# Patient Record
Sex: MALE | Race: BLACK OR AFRICAN AMERICAN | NOT HISPANIC OR LATINO | Employment: FULL TIME | URBAN - METROPOLITAN AREA
[De-identification: names, ages, dates, MRNs, and addresses within clinical notes are randomized per-mention and may not be internally consistent; named-entity substitution may affect disease eponyms.]

---

## 2023-05-09 ENCOUNTER — APPOINTMENT (OUTPATIENT)
Dept: PRIMARY CARE | Facility: CLINIC | Age: 58
End: 2023-05-09
Payer: COMMERCIAL

## 2023-05-23 ENCOUNTER — OFFICE VISIT (OUTPATIENT)
Dept: PRIMARY CARE | Facility: CLINIC | Age: 58
End: 2023-05-23
Payer: COMMERCIAL

## 2023-05-23 VITALS
WEIGHT: 230.4 LBS | SYSTOLIC BLOOD PRESSURE: 173 MMHG | DIASTOLIC BLOOD PRESSURE: 100 MMHG | TEMPERATURE: 99.3 F | HEART RATE: 72 BPM | OXYGEN SATURATION: 98 %

## 2023-05-23 DIAGNOSIS — Z09 HOSPITAL DISCHARGE FOLLOW-UP: Primary | ICD-10-CM

## 2023-05-23 DIAGNOSIS — I25.118 CORONARY ARTERY DISEASE OF NATIVE HEART WITH STABLE ANGINA PECTORIS, UNSPECIFIED VESSEL OR LESION TYPE (CMS-HCC): ICD-10-CM

## 2023-05-23 DIAGNOSIS — R07.9 CHEST PAIN, UNSPECIFIED TYPE: ICD-10-CM

## 2023-05-23 DIAGNOSIS — I10 HYPERTENSION, UNSPECIFIED TYPE: ICD-10-CM

## 2023-05-23 PROCEDURE — 99214 OFFICE O/P EST MOD 30 MIN: CPT | Performed by: STUDENT IN AN ORGANIZED HEALTH CARE EDUCATION/TRAINING PROGRAM

## 2023-05-23 PROCEDURE — 3080F DIAST BP >= 90 MM HG: CPT | Performed by: STUDENT IN AN ORGANIZED HEALTH CARE EDUCATION/TRAINING PROGRAM

## 2023-05-23 PROCEDURE — 1036F TOBACCO NON-USER: CPT | Performed by: STUDENT IN AN ORGANIZED HEALTH CARE EDUCATION/TRAINING PROGRAM

## 2023-05-23 PROCEDURE — 3077F SYST BP >= 140 MM HG: CPT | Performed by: STUDENT IN AN ORGANIZED HEALTH CARE EDUCATION/TRAINING PROGRAM

## 2023-05-23 RX ORDER — AMLODIPINE BESYLATE 5 MG/1
5 TABLET ORAL DAILY
Qty: 90 TABLET | Refills: 1 | Status: SHIPPED | OUTPATIENT
Start: 2023-05-23 | End: 2023-11-29 | Stop reason: SDUPTHER

## 2023-05-23 RX ORDER — AMLODIPINE BESYLATE 5 MG/1
1 TABLET ORAL DAILY
COMMUNITY
Start: 2016-03-07 | End: 2023-05-23 | Stop reason: SDUPTHER

## 2023-05-23 RX ORDER — ATORVASTATIN CALCIUM 80 MG/1
80 TABLET, FILM COATED ORAL DAILY
Qty: 90 TABLET | Refills: 3 | Status: SHIPPED | OUTPATIENT
Start: 2023-05-23 | End: 2024-05-22

## 2023-05-23 RX ORDER — OMEPRAZOLE 20 MG/1
1 TABLET, DELAYED RELEASE ORAL DAILY
COMMUNITY
Start: 2015-02-15 | End: 2023-05-23

## 2023-05-23 RX ORDER — ASPIRIN 81 MG/1
81 TABLET ORAL DAILY
Qty: 30 TABLET | Refills: 11 | Status: SHIPPED | OUTPATIENT
Start: 2023-05-23 | End: 2024-05-22

## 2023-05-23 RX ORDER — IBUPROFEN 600 MG/1
TABLET ORAL 4 TIMES DAILY
COMMUNITY
Start: 2020-10-30 | End: 2023-05-23

## 2023-05-23 ASSESSMENT — PAIN SCALES - GENERAL: PAINLEVEL: 6

## 2023-05-23 NOTE — PROGRESS NOTES
Subjective   Patient ID: Derik Malin is a 58 y.o. male with a PMH of HTN who presents for hospital follow up and HTN. Of note patient's last visit with our clinic was in 2020.     HPI    #Hospital Follow up  #Chest Pain  - Recently admitted from 1/9/2023 to 1/12/2023 for chest pain/HTN. ECG and trop negative. CTA coronary shows 70% stenosis of LAD and RCA and 50% in left main. Echo LVEF 55% no RWML. Stress echo positive for inducible ischemia. Cath showed RCA 80%, LM 40%, pLAD 50% and mLAD 50%, LCx none. Discharged with aspirin, atorvastatin, and amlodipine. Had been complaint, but ran out. Had follow ups with cardiology, orthopedics and PCP. Had not seen ortho or cardiology yet. Please refer to discharge summary for more details.   - Since discharge, he has been feeling a lot better, but reports occasional SOB. Denies any chest pain, peripheral edema, headaches, vision changes. Reports no other complaints.  - Ran out of his current medications for awhile now; needs refill.     #HTN  - /101 and 173/100 on repeat   - Does not have BP cuff  - Current medications: Amlodipine 5mg every day,   - Diet: salad, stopped eating red meat. Eats fish and a lot of fruits. Drinks a lot of water.   - Denies T/E/D   - Asymptomatic      Review of Systems   All other systems reviewed and are negative.  Unless mentioned in HPI; 10 systems reviewed    Objective   Physical Exam  Vitals reviewed.   Constitutional:       General: He is not in acute distress.     Appearance: Normal appearance. He is not ill-appearing.   HENT:      Head: Normocephalic and atraumatic.      Right Ear: External ear normal.      Left Ear: External ear normal.      Nose: Nose normal.      Mouth/Throat:      Mouth: Mucous membranes are moist.   Eyes:      Extraocular Movements: Extraocular movements intact.      Conjunctiva/sclera: Conjunctivae normal.   Cardiovascular:      Rate and Rhythm: Normal rate and regular rhythm.      Pulses: Normal pulses.       Heart sounds: Normal heart sounds. No murmur heard.     No friction rub. No gallop.   Pulmonary:      Effort: Pulmonary effort is normal. No respiratory distress.      Breath sounds: Normal breath sounds. No wheezing, rhonchi or rales.   Abdominal:      General: Bowel sounds are normal. There is no distension.      Palpations: Abdomen is soft. There is no mass.      Tenderness: There is no abdominal tenderness. There is no guarding or rebound.   Musculoskeletal:         General: No swelling, tenderness or deformity. Normal range of motion.      Cervical back: Normal range of motion and neck supple.      Right lower leg: No edema.      Left lower leg: No edema.   Skin:     General: Skin is warm and dry.      Capillary Refill: Capillary refill takes less than 2 seconds.   Neurological:      General: No focal deficit present.      Mental Status: He is alert and oriented to person, place, and time. Mental status is at baseline.   Psychiatric:         Mood and Affect: Mood normal.         Behavior: Behavior normal.         Thought Content: Thought content normal.         Judgment: Judgment normal.       BP (!) 179/101 (BP Location: Right arm, Patient Position: Sitting, BP Cuff Size: Adult)   Pulse 73   Temp 37.4 °C (99.3 °F) (Temporal)   Wt 105 kg (230 lb 6.4 oz)   SpO2 99%    Lab Results   Component Value Date    WBC 3.7 (L) 01/12/2023    HGB 13.1 (L) 01/12/2023    HCT 39.6 (L) 01/12/2023    MCV 86 01/12/2023     01/12/2023      Lab Results   Component Value Date    GLUCOSE 90 01/12/2023    CALCIUM 10.1 01/12/2023     01/12/2023    K 4.3 01/12/2023    CO2 27 01/12/2023     01/12/2023    BUN 24 (H) 01/12/2023    CREATININE 1.27 01/12/2023        Assessment/Plan       Mr. Malin is a 59 yo M who came to the clinic presenting for hospital follow up as he was recently hospitalized for chest pain. Also found to severely hypertensive, but asymptomatic. Otherwise, clinically stable. Plan as  below:    #Hospital Follow up  #Chest pain  - Has been doing well and is clinically stable since discharge  - Medications list reviewed and reconciled; refilled as appropriate  - Due to see Cardiology; placed referral  - Hold off on Orthopedics referral as patient is no longer complaining of shoulder pain.  - Return/ED precautions discussed    #HTN  - Not at goal; asymptomatic  - Previously controlled with amlodipine 5mg. Will continue and refilled.   - Ordered BP cuff. Advised to check a few times a week and record in BP log.  - Encourage DASH diet and exercise  - Return/ED precautions discussed    RTC in 2 weeks for HTN follow up    Seen and discussed with Dr. Clay Dunlap DO  PGY-3 Family Medicine

## 2023-05-23 NOTE — LETTER
May 23, 2023     Patient: Derik Malin   YOB: 1965   Date of Visit: 5/23/2023       To Whom It May Concern:    Derik Malin was seen in my clinic on 5/23/2023 at 3:00 pm. Please excuse Derik for his absence from work on this day to make the appointment.    If you have any questions or concerns, please don't hesitate to call.         Sincerely,         Choco Dunlap DO        CC: No Recipients

## 2023-05-24 RX ORDER — ACETAMINOPHEN 500 MG
1 TABLET ORAL 3 TIMES WEEKLY
Qty: 1 KIT | Refills: 0 | Status: SHIPPED | OUTPATIENT
Start: 2023-05-24

## 2023-05-25 NOTE — PROGRESS NOTES
I saw and evaluated the patient. I personally obtained the key and critical portions of the history and physical exam or was physically present for key and critical portions performed by the resident/fellow. I reviewed the resident/fellow's documentation and discussed the patient with the resident/fellow. I agree with the resident/fellow's medical decision making as documented in the note.  ECG reviewed.   Chandler Williamson MD

## 2023-11-29 DIAGNOSIS — I10 HYPERTENSION, UNSPECIFIED TYPE: ICD-10-CM

## 2023-11-29 RX ORDER — AMLODIPINE BESYLATE 5 MG/1
5 TABLET ORAL DAILY
Qty: 90 TABLET | Refills: 0 | Status: SHIPPED | OUTPATIENT
Start: 2023-11-29

## 2023-11-29 NOTE — PROGRESS NOTES
Refilled amlodipine as requested by pharmacy. Will request that patient make follow up to reassess blood pressure given that he was recommended to have 2 week follow up after visit in May 2023 and has not been seen since.

## 2024-04-01 ENCOUNTER — APPOINTMENT (OUTPATIENT)
Dept: PRIMARY CARE | Facility: CLINIC | Age: 59
End: 2024-04-01
Payer: MEDICARE

## 2025-01-21 ENCOUNTER — HOSPITAL ENCOUNTER (OUTPATIENT)
Facility: HOSPITAL | Age: 60
Setting detail: OBSERVATION
LOS: 1 days | Discharge: HOME | End: 2025-01-22
Attending: STUDENT IN AN ORGANIZED HEALTH CARE EDUCATION/TRAINING PROGRAM | Admitting: INTERNAL MEDICINE
Payer: MEDICARE

## 2025-01-21 ENCOUNTER — APPOINTMENT (OUTPATIENT)
Dept: RADIOLOGY | Facility: HOSPITAL | Age: 60
End: 2025-01-21
Payer: MEDICARE

## 2025-01-21 ENCOUNTER — APPOINTMENT (OUTPATIENT)
Dept: CARDIOLOGY | Facility: HOSPITAL | Age: 60
End: 2025-01-21
Payer: MEDICARE

## 2025-01-21 ENCOUNTER — CLINICAL SUPPORT (OUTPATIENT)
Dept: EMERGENCY MEDICINE | Facility: HOSPITAL | Age: 60
End: 2025-01-21
Payer: MEDICARE

## 2025-01-21 DIAGNOSIS — I10 HYPERTENSION, UNSPECIFIED TYPE: ICD-10-CM

## 2025-01-21 DIAGNOSIS — R07.9 CHEST PAIN, UNSPECIFIED TYPE: ICD-10-CM

## 2025-01-21 DIAGNOSIS — I20.0 UNSTABLE ANGINA (MULTI): ICD-10-CM

## 2025-01-21 DIAGNOSIS — I25.118 CORONARY ARTERY DISEASE OF NATIVE HEART WITH STABLE ANGINA PECTORIS, UNSPECIFIED VESSEL OR LESION TYPE: ICD-10-CM

## 2025-01-21 DIAGNOSIS — I20.89 STABLE ANGINA (CMS-HCC): Primary | ICD-10-CM

## 2025-01-21 LAB
ALBUMIN SERPL BCP-MCNC: 4.6 G/DL (ref 3.4–5)
ALP SERPL-CCNC: 60 U/L (ref 33–136)
ALT SERPL W P-5'-P-CCNC: 38 U/L (ref 10–52)
ANION GAP SERPL CALC-SCNC: 14 MMOL/L (ref 10–20)
AORTIC VALVE PEAK VELOCITY: 1.19 M/S
AST SERPL W P-5'-P-CCNC: 38 U/L (ref 9–39)
ATRIAL RATE: 76 BPM
AV PEAK GRADIENT: 6 MMHG
AVA (PEAK VEL): 2.79 CM2
BASOPHILS # BLD AUTO: 0.02 X10*3/UL (ref 0–0.1)
BASOPHILS NFR BLD AUTO: 0.5 %
BILIRUB SERPL-MCNC: 0.9 MG/DL (ref 0–1.2)
BNP SERPL-MCNC: 12 PG/ML (ref 0–99)
BUN SERPL-MCNC: 15 MG/DL (ref 6–23)
CALCIUM SERPL-MCNC: 10 MG/DL (ref 8.6–10.6)
CARDIAC TROPONIN I PNL SERPL HS: 4 NG/L (ref 0–53)
CARDIAC TROPONIN I PNL SERPL HS: 5 NG/L (ref 0–53)
CHLORIDE SERPL-SCNC: 104 MMOL/L (ref 98–107)
CHOLEST SERPL-MCNC: 168 MG/DL (ref 0–199)
CHOLESTEROL/HDL RATIO: 5.1
CO2 SERPL-SCNC: 26 MMOL/L (ref 21–32)
CREAT SERPL-MCNC: 0.91 MG/DL (ref 0.5–1.3)
D DIMER PPP FEU-MCNC: 523 NG/ML FEU
EGFRCR SERPLBLD CKD-EPI 2021: >90 ML/MIN/1.73M*2
EJECTION FRACTION APICAL 4 CHAMBER: 65.9
EJECTION FRACTION: 63 %
EOSINOPHIL # BLD AUTO: 0.12 X10*3/UL (ref 0–0.7)
EOSINOPHIL NFR BLD AUTO: 3.1 %
ERYTHROCYTE [DISTWIDTH] IN BLOOD BY AUTOMATED COUNT: 12.3 % (ref 11.5–14.5)
EST. AVERAGE GLUCOSE BLD GHB EST-MCNC: 128 MG/DL
GLUCOSE SERPL-MCNC: 113 MG/DL (ref 74–99)
HBA1C MFR BLD: 6.1 %
HCT VFR BLD AUTO: 45 % (ref 41–52)
HDLC SERPL-MCNC: 32.7 MG/DL
HGB BLD-MCNC: 14.8 G/DL (ref 13.5–17.5)
IMM GRANULOCYTES # BLD AUTO: 0 X10*3/UL (ref 0–0.7)
IMM GRANULOCYTES NFR BLD AUTO: 0 % (ref 0–0.9)
LDLC SERPL CALC-MCNC: 122 MG/DL
LEFT ATRIUM VOLUME AREA LENGTH INDEX BSA: 22.1 ML/M2
LEFT VENTRICLE INTERNAL DIMENSION DIASTOLE: 4.4 CM (ref 3.5–6)
LEFT VENTRICULAR OUTFLOW TRACT DIAMETER: 2.1 CM
LYMPHOCYTES # BLD AUTO: 1.86 X10*3/UL (ref 1.2–4.8)
LYMPHOCYTES NFR BLD AUTO: 47.8 %
MCH RBC QN AUTO: 28.8 PG (ref 26–34)
MCHC RBC AUTO-ENTMCNC: 32.9 G/DL (ref 32–36)
MCV RBC AUTO: 88 FL (ref 80–100)
MITRAL VALVE E/A RATIO: 0.75
MONOCYTES # BLD AUTO: 0.33 X10*3/UL (ref 0.1–1)
MONOCYTES NFR BLD AUTO: 8.5 %
NEUTROPHILS # BLD AUTO: 1.56 X10*3/UL (ref 1.2–7.7)
NEUTROPHILS NFR BLD AUTO: 40.1 %
NON HDL CHOLESTEROL: 135 MG/DL (ref 0–149)
NRBC BLD-RTO: 0 /100 WBCS (ref 0–0)
P AXIS: 54 DEGREES
P OFFSET: 211 MS
P ONSET: 152 MS
PLATELET # BLD AUTO: 283 X10*3/UL (ref 150–450)
POTASSIUM SERPL-SCNC: 4.3 MMOL/L (ref 3.5–5.3)
PR INTERVAL: 142 MS
PROT SERPL-MCNC: 8.4 G/DL (ref 6.4–8.2)
Q ONSET: 223 MS
QRS COUNT: 12 BEATS
QRS DURATION: 130 MS
QT INTERVAL: 434 MS
QTC CALCULATION(BAZETT): 488 MS
QTC FREDERICIA: 469 MS
R AXIS: 47 DEGREES
RBC # BLD AUTO: 5.13 X10*6/UL (ref 4.5–5.9)
RIGHT VENTRICLE FREE WALL PEAK S': 10.1 CM/S
SODIUM SERPL-SCNC: 140 MMOL/L (ref 136–145)
T AXIS: 27 DEGREES
T OFFSET: 440 MS
TRICUSPID ANNULAR PLANE SYSTOLIC EXCURSION: 1.6 CM
TRIGL SERPL-MCNC: 66 MG/DL (ref 0–149)
VENTRICULAR RATE: 76 BPM
VLDL: 13 MG/DL (ref 0–40)
WBC # BLD AUTO: 3.9 X10*3/UL (ref 4.4–11.3)

## 2025-01-21 PROCEDURE — 1200000002 HC GENERAL ROOM WITH TELEMETRY DAILY

## 2025-01-21 PROCEDURE — 80061 LIPID PANEL: CPT

## 2025-01-21 PROCEDURE — 71046 X-RAY EXAM CHEST 2 VIEWS: CPT

## 2025-01-21 PROCEDURE — 93005 ELECTROCARDIOGRAM TRACING: CPT

## 2025-01-21 PROCEDURE — 36415 COLL VENOUS BLD VENIPUNCTURE: CPT

## 2025-01-21 PROCEDURE — 85379 FIBRIN DEGRADATION QUANT: CPT

## 2025-01-21 PROCEDURE — C8929 TTE W OR WO FOL WCON,DOPPLER: HCPCS

## 2025-01-21 PROCEDURE — 93306 TTE W/DOPPLER COMPLETE: CPT | Performed by: INTERNAL MEDICINE

## 2025-01-21 PROCEDURE — 99285 EMERGENCY DEPT VISIT HI MDM: CPT | Mod: 25 | Performed by: STUDENT IN AN ORGANIZED HEALTH CARE EDUCATION/TRAINING PROGRAM

## 2025-01-21 PROCEDURE — 84484 ASSAY OF TROPONIN QUANT: CPT

## 2025-01-21 PROCEDURE — 2500000004 HC RX 250 GENERAL PHARMACY W/ HCPCS (ALT 636 FOR OP/ED)

## 2025-01-21 PROCEDURE — 2500000001 HC RX 250 WO HCPCS SELF ADMINISTERED DRUGS (ALT 637 FOR MEDICARE OP)

## 2025-01-21 PROCEDURE — 71046 X-RAY EXAM CHEST 2 VIEWS: CPT | Performed by: STUDENT IN AN ORGANIZED HEALTH CARE EDUCATION/TRAINING PROGRAM

## 2025-01-21 PROCEDURE — 83880 ASSAY OF NATRIURETIC PEPTIDE: CPT

## 2025-01-21 PROCEDURE — 83036 HEMOGLOBIN GLYCOSYLATED A1C: CPT

## 2025-01-21 PROCEDURE — 80053 COMPREHEN METABOLIC PANEL: CPT

## 2025-01-21 PROCEDURE — 85025 COMPLETE CBC W/AUTO DIFF WBC: CPT

## 2025-01-21 RX ORDER — NITROGLYCERIN 0.4 MG/1
0.4 TABLET SUBLINGUAL EVERY 5 MIN PRN
Status: DISCONTINUED | OUTPATIENT
Start: 2025-01-21 | End: 2025-01-22 | Stop reason: HOSPADM

## 2025-01-21 RX ORDER — NAPROXEN SODIUM 220 MG/1
81 TABLET, FILM COATED ORAL DAILY
Status: DISCONTINUED | OUTPATIENT
Start: 2025-01-22 | End: 2025-01-22 | Stop reason: HOSPADM

## 2025-01-21 RX ORDER — NITROGLYCERIN 0.4 MG/1
0.4 TABLET SUBLINGUAL ONCE
Status: COMPLETED | OUTPATIENT
Start: 2025-01-21 | End: 2025-01-21

## 2025-01-21 RX ORDER — NAPROXEN SODIUM 220 MG/1
325 TABLET, FILM COATED ORAL DAILY
Status: DISCONTINUED | OUTPATIENT
Start: 2025-01-21 | End: 2025-01-21

## 2025-01-21 RX ORDER — ACETAMINOPHEN 325 MG/1
975 TABLET ORAL 3 TIMES DAILY
Status: DISCONTINUED | OUTPATIENT
Start: 2025-01-21 | End: 2025-01-22 | Stop reason: HOSPADM

## 2025-01-21 RX ORDER — ATORVASTATIN CALCIUM 80 MG/1
80 TABLET, FILM COATED ORAL DAILY
Status: DISCONTINUED | OUTPATIENT
Start: 2025-01-21 | End: 2025-01-22 | Stop reason: HOSPADM

## 2025-01-21 RX ORDER — CARVEDILOL 6.25 MG/1
6.25 TABLET ORAL 2 TIMES DAILY
Status: DISCONTINUED | OUTPATIENT
Start: 2025-01-21 | End: 2025-01-22

## 2025-01-21 RX ORDER — ENOXAPARIN SODIUM 100 MG/ML
40 INJECTION SUBCUTANEOUS EVERY 24 HOURS
Status: DISCONTINUED | OUTPATIENT
Start: 2025-01-21 | End: 2025-01-22 | Stop reason: HOSPADM

## 2025-01-21 RX ORDER — AMLODIPINE BESYLATE 5 MG/1
5 TABLET ORAL DAILY
Status: DISCONTINUED | OUTPATIENT
Start: 2025-01-21 | End: 2025-01-22 | Stop reason: HOSPADM

## 2025-01-21 RX ADMIN — NITROGLYCERIN 0.4 MG: 0.4 TABLET SUBLINGUAL at 18:16

## 2025-01-21 RX ADMIN — NITROGLYCERIN 0.4 MG: 0.4 TABLET SUBLINGUAL at 10:23

## 2025-01-21 RX ADMIN — ATORVASTATIN CALCIUM 80 MG: 80 TABLET, FILM COATED ORAL at 17:13

## 2025-01-21 RX ADMIN — PERFLUTREN 3 ML OF DILUTION: 6.52 INJECTION, SUSPENSION INTRAVENOUS at 14:11

## 2025-01-21 RX ADMIN — CARVEDILOL 6.25 MG: 6.25 TABLET, FILM COATED ORAL at 20:17

## 2025-01-21 RX ADMIN — ACETAMINOPHEN 975 MG: 325 TABLET, FILM COATED ORAL at 17:12

## 2025-01-21 RX ADMIN — NITROGLYCERIN 0.4 MG: 0.4 TABLET SUBLINGUAL at 11:05

## 2025-01-21 RX ADMIN — ENOXAPARIN SODIUM 40 MG: 40 INJECTION, SOLUTION SUBCUTANEOUS at 17:13

## 2025-01-21 SDOH — SOCIAL STABILITY: SOCIAL INSECURITY: DOES ANYONE TRY TO KEEP YOU FROM HAVING/CONTACTING OTHER FRIENDS OR DOING THINGS OUTSIDE YOUR HOME?: NO

## 2025-01-21 SDOH — SOCIAL STABILITY: SOCIAL INSECURITY: DO YOU FEEL UNSAFE GOING BACK TO THE PLACE WHERE YOU ARE LIVING?: NO

## 2025-01-21 SDOH — SOCIAL STABILITY: SOCIAL INSECURITY: WITHIN THE LAST YEAR, HAVE YOU BEEN HUMILIATED OR EMOTIONALLY ABUSED IN OTHER WAYS BY YOUR PARTNER OR EX-PARTNER?: NO

## 2025-01-21 SDOH — ECONOMIC STABILITY: INCOME INSECURITY: IN THE PAST 12 MONTHS HAS THE ELECTRIC, GAS, OIL, OR WATER COMPANY THREATENED TO SHUT OFF SERVICES IN YOUR HOME?: NO

## 2025-01-21 SDOH — SOCIAL STABILITY: SOCIAL INSECURITY: HAVE YOU HAD THOUGHTS OF HARMING ANYONE ELSE?: NO

## 2025-01-21 SDOH — SOCIAL STABILITY: SOCIAL INSECURITY: WITHIN THE LAST YEAR, HAVE YOU BEEN AFRAID OF YOUR PARTNER OR EX-PARTNER?: NO

## 2025-01-21 SDOH — SOCIAL STABILITY: SOCIAL INSECURITY
WITHIN THE LAST YEAR, HAVE YOU BEEN RAPED OR FORCED TO HAVE ANY KIND OF SEXUAL ACTIVITY BY YOUR PARTNER OR EX-PARTNER?: NO

## 2025-01-21 SDOH — SOCIAL STABILITY: SOCIAL INSECURITY: DO YOU FEEL ANYONE HAS EXPLOITED OR TAKEN ADVANTAGE OF YOU FINANCIALLY OR OF YOUR PERSONAL PROPERTY?: NO

## 2025-01-21 SDOH — SOCIAL STABILITY: SOCIAL INSECURITY: HAS ANYONE EVER THREATENED TO HURT YOUR FAMILY OR YOUR PETS?: NO

## 2025-01-21 SDOH — ECONOMIC STABILITY: FOOD INSECURITY: WITHIN THE PAST 12 MONTHS, YOU WORRIED THAT YOUR FOOD WOULD RUN OUT BEFORE YOU GOT THE MONEY TO BUY MORE.: NEVER TRUE

## 2025-01-21 SDOH — SOCIAL STABILITY: SOCIAL INSECURITY: ABUSE: ADULT

## 2025-01-21 SDOH — SOCIAL STABILITY: SOCIAL INSECURITY
WITHIN THE LAST YEAR, HAVE YOU BEEN KICKED, HIT, SLAPPED, OR OTHERWISE PHYSICALLY HURT BY YOUR PARTNER OR EX-PARTNER?: NO

## 2025-01-21 SDOH — SOCIAL STABILITY: SOCIAL INSECURITY: ARE YOU OR HAVE YOU BEEN THREATENED OR ABUSED PHYSICALLY, EMOTIONALLY, OR SEXUALLY BY ANYONE?: NO

## 2025-01-21 SDOH — SOCIAL STABILITY: SOCIAL INSECURITY: HAVE YOU HAD ANY THOUGHTS OF HARMING ANYONE ELSE?: NO

## 2025-01-21 SDOH — ECONOMIC STABILITY: FOOD INSECURITY: WITHIN THE PAST 12 MONTHS, THE FOOD YOU BOUGHT JUST DIDN'T LAST AND YOU DIDN'T HAVE MONEY TO GET MORE.: NEVER TRUE

## 2025-01-21 SDOH — SOCIAL STABILITY: SOCIAL INSECURITY: ARE THERE ANY APPARENT SIGNS OF INJURIES/BEHAVIORS THAT COULD BE RELATED TO ABUSE/NEGLECT?: NO

## 2025-01-21 SDOH — SOCIAL STABILITY: SOCIAL INSECURITY: WERE YOU ABLE TO COMPLETE ALL THE BEHAVIORAL HEALTH SCREENINGS?: YES

## 2025-01-21 ASSESSMENT — ACTIVITIES OF DAILY LIVING (ADL)
JUDGMENT_ADEQUATE_SAFELY_COMPLETE_DAILY_ACTIVITIES: YES
TOILETING: INDEPENDENT
FEEDING YOURSELF: INDEPENDENT
GROOMING: INDEPENDENT
BATHING: INDEPENDENT
WALKS IN HOME: INDEPENDENT
PATIENT'S MEMORY ADEQUATE TO SAFELY COMPLETE DAILY ACTIVITIES?: YES
LACK_OF_TRANSPORTATION: NO
HEARING - RIGHT EAR: FUNCTIONAL
DRESSING YOURSELF: INDEPENDENT
ADEQUATE_TO_COMPLETE_ADL: YES
HEARING - LEFT EAR: FUNCTIONAL

## 2025-01-21 ASSESSMENT — LIFESTYLE VARIABLES
HOW OFTEN DO YOU HAVE 6 OR MORE DRINKS ON ONE OCCASION: NEVER
HOW MANY STANDARD DRINKS CONTAINING ALCOHOL DO YOU HAVE ON A TYPICAL DAY: PATIENT DOES NOT DRINK
AUDIT-C TOTAL SCORE: 0
EVER HAD A DRINK FIRST THING IN THE MORNING TO STEADY YOUR NERVES TO GET RID OF A HANGOVER: NO
AUDIT-C TOTAL SCORE: 0
EVER FELT BAD OR GUILTY ABOUT YOUR DRINKING: NO
HAVE YOU EVER FELT YOU SHOULD CUT DOWN ON YOUR DRINKING: NO
TOTAL SCORE: 0
SKIP TO QUESTIONS 9-10: 1
HOW OFTEN DO YOU HAVE A DRINK CONTAINING ALCOHOL: NEVER
HAVE PEOPLE ANNOYED YOU BY CRITICIZING YOUR DRINKING: NO

## 2025-01-21 ASSESSMENT — COGNITIVE AND FUNCTIONAL STATUS - GENERAL
DAILY ACTIVITIY SCORE: 24
PATIENT BASELINE BEDBOUND: NO
MOBILITY SCORE: 24
MOBILITY SCORE: 24
DAILY ACTIVITIY SCORE: 24

## 2025-01-21 ASSESSMENT — PAIN SCALES - GENERAL
PAINLEVEL_OUTOF10: 8
PAINLEVEL_OUTOF10: 0 - NO PAIN
PAINLEVEL_OUTOF10: 6
PAINLEVEL_OUTOF10: 4

## 2025-01-21 ASSESSMENT — ENCOUNTER SYMPTOMS
NUMBNESS: 1
CHEST TIGHTNESS: 0
ABDOMINAL DISTENTION: 0
ABDOMINAL PAIN: 0
DIFFICULTY URINATING: 0
PALPITATIONS: 0
NAUSEA: 1
MYALGIAS: 1

## 2025-01-21 ASSESSMENT — PAIN DESCRIPTION - PAIN TYPE: TYPE: ACUTE PAIN

## 2025-01-21 ASSESSMENT — COLUMBIA-SUICIDE SEVERITY RATING SCALE - C-SSRS
2. HAVE YOU ACTUALLY HAD ANY THOUGHTS OF KILLING YOURSELF?: NO
6. HAVE YOU EVER DONE ANYTHING, STARTED TO DO ANYTHING, OR PREPARED TO DO ANYTHING TO END YOUR LIFE?: NO
1. IN THE PAST MONTH, HAVE YOU WISHED YOU WERE DEAD OR WISHED YOU COULD GO TO SLEEP AND NOT WAKE UP?: NO

## 2025-01-21 ASSESSMENT — PAIN - FUNCTIONAL ASSESSMENT
PAIN_FUNCTIONAL_ASSESSMENT: 0-10

## 2025-01-21 ASSESSMENT — PAIN DESCRIPTION - DESCRIPTORS
DESCRIPTORS: POUNDING
DESCRIPTORS: POUNDING

## 2025-01-21 ASSESSMENT — PATIENT HEALTH QUESTIONNAIRE - PHQ9
2. FEELING DOWN, DEPRESSED OR HOPELESS: NOT AT ALL
SUM OF ALL RESPONSES TO PHQ9 QUESTIONS 1 & 2: 0
1. LITTLE INTEREST OR PLEASURE IN DOING THINGS: NOT AT ALL

## 2025-01-21 NOTE — PROGRESS NOTES
"Pharmacy Medication History Review    Derik Malin is a 60 y.o. male admitted for Unstable angina (Multi). Pharmacy reviewed the patient's [id number] medications and allergies for accuracy.    Medications ADDED:  N/A  Medications CHANGED:  N/A  Medications REMOVED:   N/A     The list below reflects the updated PTA list.   Prior to Admission Medications   Prescriptions Last Dose Informant   amLODIPine (Norvasc) 5 mg tablet 2025 Spouse/Significant Other   Sig: Take 1 tablet (5 mg) by mouth once daily.   atorvastatin (Lipitor) 80 mg tablet Not Taking    Sig: Take 1 tablet (80 mg) by mouth once daily.   Patient not taking: Reported on 2025   blood pressure monitor (Blood Pressure Kit) kit  Spouse/Significant Other   Si kit 3 (three) times a week.      Facility-Administered Medications: None        The list below reflects the updated allergy list. Please review each documented allergy for additional clarification and justification.  Allergies  Reviewed by Mei aHle on 2025   No Known Allergies         Patient accepts M2B at discharge.     Sources:   [facility]  Pharmacy dispense history  Spouse, Good historian     Additional Comments:  I interviewed the patients wife Genia Malin listed as the Alternate .      MEI HALE  Pharmacy Technician  25     Secure Chat preferred   If no response call [phone] or [email or web address] \"Med Rec\"   "

## 2025-01-21 NOTE — ED PROVIDER NOTES
HPI   Chief Complaint   Patient presents with    Chest Pain       HPI  Patient is a 60-year-old past medical history of CAD status post CABG, A-fib not anticoagulated presenting with chest pain.  Patient said yesterday when he shoveled snow, though he did not have chest pain but he felt weird.  Unfortunately, around 6:30 AM this morning he has been having chest pain.  The chest pain is persistent sharp and radiates towards his right arm.  Patient has experienced this before.  It was about 10 years ago.  Patient denies any fever, nausea and vomiting.      Patient History   No past medical history on file.  Past Surgical History:   Procedure Laterality Date    CT ABDOMEN PELVIS ANGIOGRAM W AND/OR WO IV CONTRAST  11/25/2013    CT ABDOMEN PELVIS ANGIOGRAM W AND/OR WO IV CONTRAST 11/25/2013 [facility] EMERGENCY LEGACY    CT ANGIO HEART CORONARY  1/10/2023    CT HEART CORONARY ANGIOGRAM 1/10/2023 DOCTOR OFFICE LEGACY    HERNIA REPAIR  02/24/2015    Hernia Repair Inguinal Sliding    MR HEAD ANGIO WO IV CONTRAST  2/28/2016    MR HEAD ANGIO WO IV CONTRAST 2/28/2016 [facility] CLINICAL LEGACY    MR NECK ANGIO WO IV CONTRAST  2/28/2016    MR NECK ANGIO WO IV CONTRAST 2/28/2016 [facility] CLINICAL LEGACY     No family history on file.  Social History     Tobacco Use    Smoking status: Never    Smokeless tobacco: Never   Substance Use Topics    Alcohol use: Never    Drug use: Never       Physical Exam   ED Triage Vitals [01/21/25 0952]   Temperature Heart Rate Respirations BP   36.6 °C (97.9 °F) 77 18 157/89      Pulse Ox Temp Source Heart Rate Source Patient Position   96 % Oral Monitor --      BP Location FiO2 (%)     -- --       Physical Exam  Constitutional:       Appearance: He is well-developed.   HENT:      Head: Normocephalic and atraumatic.   Cardiovascular:      Rate and Rhythm: Normal rate and regular rhythm.      Heart sounds: Normal heart sounds.   Pulmonary:      Effort: Pulmonary effort is normal.      Breath sounds: Normal breath  sounds.   Abdominal:      General: Bowel sounds are normal.      Palpations: Abdomen is soft.   Musculoskeletal:      Cervical back: Normal range of motion.   Skin:     General: Skin is warm.   Neurological:      General: No focal deficit present.      Mental Status: He is alert and oriented to person, place, and time.           ED Course & MDM   ED Course as of 01/21/25 1225   Tue Jan 21, 2025   1035 ED resident supervision attestation: 60-year-old male with extensive cardiac history presenting to the emergency department with right-sided chest pain that started after shoveling snow.  Describes radiation into his right arm.  Got 324 mg of aspirin prehospital.  Still endorsing chest pain despite giving sublingual nitroglycerin.  Not undergone cardiac stress testing or echo in the past 2 years.  On physical exam, patient is hemodynamically stable, still endorsing chest tightness and discomfort despite receiving sublingual nitrogen.  No reproducible chest wall tenderness. Bilateral 2+ radial pulses, no abdominal distention, guarding or discomfort to palpation.  EKG shows a new right bundle branch block, we will proceed with cardiopulmonary workup, chest x-ray, offer pain control with plan for admission for cardiac restratification and screening for possible PE given EKG findings and comorbidities.    Nesha Garcia DO  Emergency Medicine PGY-3 [PW]      ED Course User Index  [PW] Nesha Gracia DO                 No data recorded     [place] Coma Scale Score: 15 (01/21/25 0954 : Annie Maddox RN)                           Medical Decision Making  Patient is a 60-year-old past medical history of CAD status post CABG, A-fib not anticoagulated presenting with chest pain.  The differential diagnoses considered for this patient were myocardial infarction, unstable angina, pneumonia and CHF.  At bedside patient was hemodynamically stable and reserved.  Patient was endorsing chest pain show patient received 2 rounds of  nitroglycerin.  Patient chest pain improved after the second dose.  Patient physical exam was benign.  Patient EKG showed normal sinus rhythm, with ventricular rate of 76.  Patient did have new right bundle branch with some V3 V4 ST depressions.  Patient initial troponin was 4 and repeat troponin was 5.  This makes myocardial infarction less likely.  Patient also had BNP less than 12 and chest x-ray without pulmonary edema.  CHF exacerbation is less likely.  Patient did have D-dimers as elevated at 523.  Using years criteria pulmonary embolism was excluded.  Patient CBC and CMP were unremarkable except for leukopenia at 3.9.  This is around patient baseline.  With the new finding of right bundle branch and excruciating chest pain radiating to the right hand, patient was admitted to the medicine team for cardiac workup.  Patient last echogram was about 2 years ago.  Patient did have stress test about a month ago which was unremarkable.        Procedure  Procedures     Ramin Maldonado MD  Resident  01/21/25 1224       Ramin Maldonado MD  Resident  01/21/25 1244

## 2025-01-21 NOTE — H&P
History Of Present Illness  Derik Malin is a 60 y.o. male with PMH CAD s/p CABG x2 (LIMA to LAD, SVG to OM) in 2/2024, HTN, HLD, and pAFib (no AC) who presents to [facility] ED on 1/21/25 with right arm and shoulder pain. This pain is noted to continue down his arm. This pain started yesterday, but he didn't really take note until this morning. This morning, he was sitting at his desk at home and stated that his pain was increasing to 8/10. This pain was bothering enough that he decided to come in. After receiving nitroglycerin, he reports that this pain decreased to 6/10.     He reports that he only takes amlodipine 5mg daily at home.     He denies weakness, shortness of breath, NV, or fever.     Past Medical History  He has no past medical history on file.    Surgical History  He has a past surgical history that includes Hernia repair (02/24/2015); CT angio abdomen pelvis w and or wo IV IV contrast (11/25/2013); MR angio head wo IV contrast (2/28/2016); MR angio neck wo IV contrast (2/28/2016); and CT angio coronary art with heartflow if score >30% (1/10/2023).     Social History  He reports that he has never smoked. He has never used smokeless tobacco. He reports that he does not drink alcohol and does not use drugs.    Family History  No family history on file.     Allergies  Patient has no known allergies.    Review of Systems   Respiratory:  Negative for chest tightness.    Cardiovascular:  Negative for chest pain, palpitations and leg swelling.   Gastrointestinal:  Positive for nausea. Negative for abdominal distention and abdominal pain.   Genitourinary:  Negative for difficulty urinating.   Musculoskeletal:  Positive for myalgias.        Right shoulder pain radiating down arm   Neurological:  Positive for numbness.     Physical Exam  Constitutional:       Appearance: Normal appearance.   Cardiovascular:      Rate and Rhythm: Normal rate and regular rhythm.      Pulses: Normal pulses.      Heart sounds: Normal  heart sounds. No murmur heard.  Pulmonary:      Effort: Pulmonary effort is normal. No respiratory distress.      Comments: Decreased breath sounds noted in lower lobes  Abdominal:      General: Abdomen is flat.      Palpations: Abdomen is soft.   Musculoskeletal:         General: No swelling or tenderness.   Neurological:      General: No focal deficit present.      Mental Status: He is alert and oriented to person, place, and time.   Psychiatric:         Mood and Affect: Mood normal.         Behavior: Behavior normal.       Last Recorded Vitals  BP (!) 171/93 Comment: RN aware  Pulse 76   Temp 36.3 °C (97.4 °F) (Temporal)   Resp 18   SpO2 96%     Echo on 2/1/2024   Left Ventricular Ejection Fraction: 65 %   Normal LV systolic function.   The left ventricular ejection fraction (LVEF) is 65%.   Normal RV systolic function.   No hemodynamically significant valve disease.   The pulmonary artery systolic pressure could not be estimated.   Noninvasive hemodynamic assessment is consistent with a normal CVP.    Cath 1/12/2023  Vessel Stenosis Vessel Segment  Left Main 40% stenosis proximal to mid  LAD 50% stenosis proximal  LAD 50% stenosis mid  LAD 70% stenosis distal  Circumflex 40% stenosis proximal  [place] 70% stenosis ostial  RCA 50% stenosis proximal  RCA 80% stenosis mid  RCA 80% stenosis distal  RPL 80% stenosis ostial     TTE 1/21/25   1. The left ventricular systolic function is normal, with a visually estimated ejection fraction of 60-65%.   2. Abnormal septal motion consistent with post-operative status.   3. There is normal right ventricular global systolic function.   4. Mildly enlarged right ventricle.   5. Mild aortic valve regurgitation.   6. Normal aortic root.    Assessment/Plan   Derik Malin is a 59 yo male with PMHx significant for CAD s/p CABG x2 (LIMA to LAD, SVG to OM) in 2/2024, HTN, HLD, and pAFib (no AC) who presents to [facility] ED on 1/21/25 with chest pain.     Updates 1/21/25  Patient is  HDS with chest pain improving after nitroglycerin. Negative troponins and BNP. D-Dimer was normal with age correction. Previous notes show RCA disease, however noted to be small target.   - start aspirin 81mg every day  - continuing amlodipine 5mg every day  - starting atorvastatin 80mg every day  - starting coreg 6.25mg BID with elevated BP  - plan for medical optimization and potential stress test    #Stable Angina  ::Troponin: 5  ::BNP: 12  ::D-Dimer 523 -> age corrected normal  ::presented at rest, relieved with nitroglycerin  ::previous CABG only addressed left sided lesions  - aspirin 325mg in ED  - continue 81mg aspirin every day  - OP Note from 2/12/24 noted significant disease of the right coronary circulation. However, the PDA seemed to be a small target and the ventricular branch of the right was also small and had severe disease in it.   - planning for stress test inpatient and medical optimization    #HTN  #HLD  - continue amlodipine 5mg every day  - starting coreg 6.25mg BID  - starting atorvastatin 80mg every day    F: PRN  E: PRN  N: adult cardiac  A: PIV  DVT: lovenox 40mg     Shreyas Nuñez DO

## 2025-01-21 NOTE — LETTER
January 22, 2025     Patient: Derik Malin   YOB: 1965   Date of Visit: 1/21/2025       To Whom It May Concern:    Derik Malin was seen at [facility] ED on 1/21/2025 for chest pain on exertion that was shown to be stable angina. We investigated his heart disease and determined that he has stable blood flow to his heart currently. We discharged him on new medications to improve this blood flow to his heart. Please excuse Derik for his absence from work until 1/27/25 to adapt to his new medications.    If you have any questions or concerns, please don't hesitate to call.         Sincerely,   [facility] Cardiology Team

## 2025-01-21 NOTE — ED TRIAGE NOTES
BIB EMS for Right sided CP. Per patient EMS gave him . PMHX of CABG. Patient states yesterday he was shoveling snow and woke up this morning around 06:00 with right sided CP. Patient states currently he is unable to lift right arm without pain.

## 2025-01-21 NOTE — PROGRESS NOTES
Derik Malin is a 59 y/o M with a PMHx of CAD (Diffuse LAD, OM, and RCA dz) s/p CABG x2 (LIMA-LAD, OM1-SVG) 2/2024, c/b postop Afib w/ RVR (resolved on outpatient monitor), HTN, DLD, who presents with 1 day of chest pain radiating to the right arm. He reports chronic right-sided chest pain associated with strenuous physical activity however this morning was driving to work when he noticed 9/10 stabbing/heavy chest pain over the right chest radiating to the right arm. Associated symptoms include numbness in his right hand. He states it has been ~10 years since his last such episode. He denies fever, chills, diaphoresis, shortness of breath, palpitations, abdominal pain, N, though notes he vomits/spits up food when eating frequently which has been a chronic issue. He states he currently feels better but still has 6/10 chest pain.    Objective:  Visit Vitals  BP (!) 171/93 Comment: RN aware   Pulse 76   Temp 36.3 °C (97.4 °F) (Temporal)   Resp 18      Physical Exam:  General: Comfortable appearing elderly male sitting up in bed. In NAD  HEENT: PERRLA, EOMI, no scleral icterus, no conjunctival pallow  Pulm: Slightly decreased breath sounds at the bases, no crackles, no increased WOB on RA   Cards: RRR, no M/R/G, no pain reproducible with plapation   Abdomen: Distended, no fluid wave, nontender to palpation, BS+    Assessment/Plan:  Derik Malin is a 59 y/o M with a PMHx of CAD (Diffuse LAD, OM, and RCA dz) s/p CABG x2 (LIMA-LAD, OM1-SVG) 2/2024,  c/b postop Afib w/ RVR (resolved on outpatient monitor), HTN, DLD, who presents with 1 day of chest pain radiating to the right arm. On arrival he is AF, HDS, EKG with RBBB (seen on EKG 8/2024), CBC/CMP WNL, Trop 4>5, D-Dimer age appropriate, CXR with some congestion. He was given nitro SL x2 with benefit admitted for further cardiac workup. Currently favor stable angina, and cannot rule out progressive known/untreated RCA disease as underlying etiology. Other possible etiologies  include esophageal stricture/spasm/GERD/PUD given reports of vomiting and possible dysphagia. Less likely pulmonary. Will plan admit with plan for inpatient ischemic evaluation and possible CTS consultation     #Chest Pain   #Stable Angina  #CAD (Diffuse LAD, OM, and RCA dz) s/p CABG x2 (LIMA-LAD, OM1-SVG) 2/2024  #HTN  #DLD   ::Trop 4 > 5  ::CXR with mild pulmonary congestion   ::Cath 1/2023 - LM 40% stenosis, LAD, 50-70% stenosis, Circ 40%, Ostial 70%, RCA 50-80-80% stenosis, RPL 80%  ::TTE 2/2024 - EF 55-60%, mild MR, no MS, no SHERLYN, no RWMA   ::Favor anginal pain from residual known CAD, however other differentials include GI etiology   Plan:  - Update A1c, Lipids  - Start ASA and Atorva 80  - Start Coreg 6.25 BID  - Continue home Amlodipine 5 daily  - Pain: Tylenol scheduled, Nitroglycerin SL prn, Dilaudid breakthrough  - Likely inpatient ischemic eval with stress test in AM, followed by possible CTS evaluation   - May consider getting CT Chest to evaluate for esophageal issues if persistently symptomatic     F: PRN  E: PRN  N: Cardiac  A: PIV    DVT: Lovenox   Code Status: Full  EC: Genia Malin (spouse) [phone]    Inderjit Gagnon MD  PGY-2, Internal Medicine

## 2025-01-22 ENCOUNTER — PHARMACY VISIT (OUTPATIENT)
Dept: PHARMACY | Facility: CLINIC | Age: 60
End: 2025-01-22
Payer: MEDICARE

## 2025-01-22 ENCOUNTER — APPOINTMENT (OUTPATIENT)
Dept: CARDIOLOGY | Facility: HOSPITAL | Age: 60
End: 2025-01-22
Payer: MEDICARE

## 2025-01-22 ENCOUNTER — APPOINTMENT (OUTPATIENT)
Dept: RADIOLOGY | Facility: HOSPITAL | Age: 60
End: 2025-01-22
Payer: MEDICARE

## 2025-01-22 VITALS
HEIGHT: 72 IN | BODY MASS INDEX: 31.2 KG/M2 | DIASTOLIC BLOOD PRESSURE: 79 MMHG | SYSTOLIC BLOOD PRESSURE: 129 MMHG | OXYGEN SATURATION: 99 % | RESPIRATION RATE: 18 BRPM | HEART RATE: 78 BPM | TEMPERATURE: 97.5 F | WEIGHT: 230.38 LBS

## 2025-01-22 LAB
ALBUMIN SERPL BCP-MCNC: 4.1 G/DL (ref 3.4–5)
ANION GAP SERPL CALC-SCNC: 13 MMOL/L (ref 10–20)
BASOPHILS # BLD AUTO: 0.02 X10*3/UL (ref 0–0.1)
BASOPHILS NFR BLD AUTO: 0.5 %
BUN SERPL-MCNC: 14 MG/DL (ref 6–23)
CALCIUM SERPL-MCNC: 9.9 MG/DL (ref 8.6–10.6)
CHLORIDE SERPL-SCNC: 106 MMOL/L (ref 98–107)
CO2 SERPL-SCNC: 25 MMOL/L (ref 21–32)
CREAT SERPL-MCNC: 1.01 MG/DL (ref 0.5–1.3)
EGFRCR SERPLBLD CKD-EPI 2021: 84 ML/MIN/1.73M*2
EOSINOPHIL # BLD AUTO: 0.14 X10*3/UL (ref 0–0.7)
EOSINOPHIL NFR BLD AUTO: 3.7 %
ERYTHROCYTE [DISTWIDTH] IN BLOOD BY AUTOMATED COUNT: 12.7 % (ref 11.5–14.5)
GLUCOSE SERPL-MCNC: 92 MG/DL (ref 74–99)
HCT VFR BLD AUTO: 43.4 % (ref 41–52)
HGB BLD-MCNC: 13.7 G/DL (ref 13.5–17.5)
IMM GRANULOCYTES # BLD AUTO: 0.01 X10*3/UL (ref 0–0.7)
IMM GRANULOCYTES NFR BLD AUTO: 0.3 % (ref 0–0.9)
LYMPHOCYTES # BLD AUTO: 1.7 X10*3/UL (ref 1.2–4.8)
LYMPHOCYTES NFR BLD AUTO: 44.7 %
MAGNESIUM SERPL-MCNC: 2.21 MG/DL (ref 1.6–2.4)
MCH RBC QN AUTO: 28.5 PG (ref 26–34)
MCHC RBC AUTO-ENTMCNC: 31.6 G/DL (ref 32–36)
MCV RBC AUTO: 90 FL (ref 80–100)
MONOCYTES # BLD AUTO: 0.45 X10*3/UL (ref 0.1–1)
MONOCYTES NFR BLD AUTO: 11.8 %
NEUTROPHILS # BLD AUTO: 1.48 X10*3/UL (ref 1.2–7.7)
NEUTROPHILS NFR BLD AUTO: 39 %
NRBC BLD-RTO: 0 /100 WBCS (ref 0–0)
PHOSPHATE SERPL-MCNC: 3.8 MG/DL (ref 2.5–4.9)
PLATELET # BLD AUTO: 265 X10*3/UL (ref 150–450)
POTASSIUM SERPL-SCNC: 4 MMOL/L (ref 3.5–5.3)
RBC # BLD AUTO: 4.81 X10*6/UL (ref 4.5–5.9)
SODIUM SERPL-SCNC: 140 MMOL/L (ref 136–145)
WBC # BLD AUTO: 3.8 X10*3/UL (ref 4.4–11.3)

## 2025-01-22 PROCEDURE — 80069 RENAL FUNCTION PANEL: CPT

## 2025-01-22 PROCEDURE — 2500000001 HC RX 250 WO HCPCS SELF ADMINISTERED DRUGS (ALT 637 FOR MEDICARE OP)

## 2025-01-22 PROCEDURE — 99223 1ST HOSP IP/OBS HIGH 75: CPT | Performed by: INTERNAL MEDICINE

## 2025-01-22 PROCEDURE — 93018 CV STRESS TEST I&R ONLY: CPT | Performed by: INTERNAL MEDICINE

## 2025-01-22 PROCEDURE — 97161 PT EVAL LOW COMPLEX 20 MIN: CPT | Mod: GP

## 2025-01-22 PROCEDURE — G0378 HOSPITAL OBSERVATION PER HR: HCPCS

## 2025-01-22 PROCEDURE — 93016 CV STRESS TEST SUPVJ ONLY: CPT | Performed by: INTERNAL MEDICINE

## 2025-01-22 PROCEDURE — 85025 COMPLETE CBC W/AUTO DIFF WBC: CPT

## 2025-01-22 PROCEDURE — 78452 HT MUSCLE IMAGE SPECT MULT: CPT

## 2025-01-22 PROCEDURE — 96372 THER/PROPH/DIAG INJ SC/IM: CPT

## 2025-01-22 PROCEDURE — 78452 HT MUSCLE IMAGE SPECT MULT: CPT | Performed by: NUCLEAR MEDICINE

## 2025-01-22 PROCEDURE — 93017 CV STRESS TEST TRACING ONLY: CPT

## 2025-01-22 PROCEDURE — 2500000004 HC RX 250 GENERAL PHARMACY W/ HCPCS (ALT 636 FOR OP/ED)

## 2025-01-22 PROCEDURE — A9502 TC99M TETROFOSMIN: HCPCS | Performed by: INTERNAL MEDICINE

## 2025-01-22 PROCEDURE — RXMED WILLOW AMBULATORY MEDICATION CHARGE

## 2025-01-22 PROCEDURE — 36415 COLL VENOUS BLD VENIPUNCTURE: CPT

## 2025-01-22 PROCEDURE — 83735 ASSAY OF MAGNESIUM: CPT

## 2025-01-22 PROCEDURE — 3430000001 HC RX 343 DIAGNOSTIC RADIOPHARMACEUTICALS: Performed by: INTERNAL MEDICINE

## 2025-01-22 RX ORDER — CARVEDILOL 12.5 MG/1
12.5 TABLET ORAL 2 TIMES DAILY
Qty: 60 TABLET | Refills: 1 | Status: SHIPPED | OUTPATIENT
Start: 2025-01-22 | End: 2025-03-23

## 2025-01-22 RX ORDER — NAPROXEN SODIUM 220 MG/1
81 TABLET, FILM COATED ORAL DAILY
Qty: 30 TABLET | Refills: 1 | Status: SHIPPED | OUTPATIENT
Start: 2025-01-23 | End: 2025-03-24

## 2025-01-22 RX ORDER — NITROGLYCERIN 0.4 MG/1
0.4 TABLET SUBLINGUAL EVERY 5 MIN PRN
Qty: 100 TABLET | Refills: 0 | Status: SHIPPED | OUTPATIENT
Start: 2025-01-22 | End: 2025-02-21

## 2025-01-22 RX ORDER — LOSARTAN POTASSIUM 25 MG/1
25 TABLET ORAL DAILY
Status: DISCONTINUED | OUTPATIENT
Start: 2025-01-22 | End: 2025-01-22 | Stop reason: HOSPADM

## 2025-01-22 RX ORDER — NAPROXEN SODIUM 220 MG/1
81 TABLET, FILM COATED ORAL DAILY
Start: 2025-01-23 | End: 2025-01-22

## 2025-01-22 RX ORDER — ATORVASTATIN CALCIUM 80 MG/1
80 TABLET, FILM COATED ORAL DAILY
Qty: 90 TABLET | Refills: 0 | Status: SHIPPED | OUTPATIENT
Start: 2025-01-22 | End: 2025-04-22

## 2025-01-22 RX ORDER — AMLODIPINE BESYLATE 5 MG/1
5 TABLET ORAL DAILY
Start: 2025-01-22 | End: 2025-03-23

## 2025-01-22 RX ORDER — NITROGLYCERIN 0.4 MG/1
0.4 TABLET SUBLINGUAL EVERY 5 MIN PRN
Start: 2025-01-22 | End: 2025-01-22

## 2025-01-22 RX ORDER — LOSARTAN POTASSIUM 25 MG/1
25 TABLET ORAL DAILY
Start: 2025-01-22 | End: 2025-01-22

## 2025-01-22 RX ORDER — LOSARTAN POTASSIUM 25 MG/1
25 TABLET ORAL DAILY
Qty: 30 TABLET | Refills: 1 | Status: SHIPPED | OUTPATIENT
Start: 2025-01-22 | End: 2025-03-23

## 2025-01-22 RX ORDER — ATORVASTATIN CALCIUM 80 MG/1
80 TABLET, FILM COATED ORAL DAILY
Start: 2025-01-22 | End: 2025-03-23

## 2025-01-22 RX ORDER — CARVEDILOL 12.5 MG/1
12.5 TABLET ORAL 2 TIMES DAILY
Status: DISCONTINUED | OUTPATIENT
Start: 2025-01-22 | End: 2025-01-22 | Stop reason: HOSPADM

## 2025-01-22 RX ORDER — CARVEDILOL 12.5 MG/1
12.5 TABLET ORAL 2 TIMES DAILY
Start: 2025-01-22 | End: 2025-01-22

## 2025-01-22 RX ADMIN — AMLODIPINE BESYLATE 5 MG: 5 TABLET ORAL at 08:12

## 2025-01-22 RX ADMIN — ENOXAPARIN SODIUM 40 MG: 40 INJECTION, SOLUTION SUBCUTANEOUS at 14:17

## 2025-01-22 RX ADMIN — LOSARTAN POTASSIUM 25 MG: 25 TABLET, FILM COATED ORAL at 14:39

## 2025-01-22 RX ADMIN — ATORVASTATIN CALCIUM 80 MG: 80 TABLET, FILM COATED ORAL at 08:12

## 2025-01-22 RX ADMIN — ACETAMINOPHEN 975 MG: 325 TABLET, FILM COATED ORAL at 08:12

## 2025-01-22 RX ADMIN — ACETAMINOPHEN 975 MG: 325 TABLET, FILM COATED ORAL at 14:17

## 2025-01-22 RX ADMIN — TETROFOSMIN 27 MILLICURIE: 0.23 INJECTION, POWDER, LYOPHILIZED, FOR SOLUTION INTRAVENOUS at 12:46

## 2025-01-22 RX ADMIN — TETROFOSMIN 12 MILLICURIE: 0.23 INJECTION, POWDER, LYOPHILIZED, FOR SOLUTION INTRAVENOUS at 10:40

## 2025-01-22 RX ADMIN — CARVEDILOL 6.25 MG: 6.25 TABLET, FILM COATED ORAL at 08:12

## 2025-01-22 RX ADMIN — ASPIRIN 81 MG: 81 TABLET, CHEWABLE ORAL at 08:12

## 2025-01-22 ASSESSMENT — COGNITIVE AND FUNCTIONAL STATUS - GENERAL
WALKING IN HOSPITAL ROOM: A LITTLE
MOBILITY SCORE: 24
CLIMB 3 TO 5 STEPS WITH RAILING: A LITTLE
STANDING UP FROM CHAIR USING ARMS: A LITTLE
DAILY ACTIVITIY SCORE: 24
MOBILITY SCORE: 21

## 2025-01-22 ASSESSMENT — ACTIVITIES OF DAILY LIVING (ADL)
LACK_OF_TRANSPORTATION: NO
ADL_ASSISTANCE: INDEPENDENT

## 2025-01-22 ASSESSMENT — PAIN SCALES - GENERAL
PAINLEVEL_OUTOF10: 3
PAINLEVEL_OUTOF10: 0 - NO PAIN
PAINLEVEL_OUTOF10: 0 - NO PAIN

## 2025-01-22 ASSESSMENT — PAIN - FUNCTIONAL ASSESSMENT
PAIN_FUNCTIONAL_ASSESSMENT: 0-10
PAIN_FUNCTIONAL_ASSESSMENT: 0-10

## 2025-01-22 NOTE — PROGRESS NOTES
Sudheer Malin is a 60 y.o. male on day 1 of admission presenting with Unstable angina (Multi).    Subjective   NAEO. Today, patient was comfortable in bed. Reports 3/10 pain in chest. Discussed plans for stress test today.     Objective     Last Recorded Vitals  /85 (BP Location: Right arm, Patient Position: Lying)   Pulse 68   Temp 36.2 °C (97.2 °F) (Temporal)   Resp 18   Wt 104 kg (230 lb 6.1 oz)   SpO2 99%   Intake/Output last 3 Shifts:    Intake/Output Summary (Last 24 hours) at 1/22/2025 0702  Last data filed at 1/22/2025 0510  Gross per 24 hour   Intake 240 ml   Output 300 ml   Net -60 ml       Admission Weight  Weight: 110 kg (243 lb) (01/21/25 1829)    Daily Weight  01/22/25 : 104 kg (230 lb 6.1 oz)    Image Results  ECG 12 Lead  See ED provider note for full interpretation and clinical correlation  Confirmed by Josef Diane ([id number]) on 1/21/2025 5:08:11 PM  Transthoracic Echo (TTE) [facility], [address] [place] [address]                 Tel [phone] and Fax [phone]    TRANSTHORACIC ECHOCARDIOGRAM REPORT       Patient Name:       SUDHEER MALIN         Reading Physician:    [id number] Derek Pugh MD  Study Date:         1/21/2025           Ordering Provider:    [id number] AHN  MRN/PID:            [id number]            Fellow:               [id number] Shiva Santizo MD  Accession#:         [id number]        Nurse:                Lyudmila Agudelo RN  Date of Birth/Age:  1965 / 60      Sonographer:          Fela grimes                                     [facility]  Gender assigned at  M                   Additional Staff:  Birth:  Height:             182.88 cm           Admit Date:           1/21/2025  Weight:             104.33 kg            Admission Status:     Inpatient -                                                                Routine  BSA / BMI:          2.26 m2 / 31.19     Encounter#:           [id number]                      kg/m2  Blood Pressure:     157/89 mmHg         Department Location:    Study Type:    TRANSTHORACIC ECHO (TTE) COMPLETE  Diagnosis/ICD: Unstable angina-I20.0  Indication:    chest pain, history of CABG 2/2024  CPT Code:      Echo Complete w Full Doppler-24224   Study Detail: The following Echo studies were performed: 2D, M-Mode, Doppler and                color flow. Definity used as a contrast agent for endocardial                border definition.       PHYSICIAN INTERPRETATION:  Left Ventricle: The left ventricular systolic function is normal, with a visually estimated ejection fraction of 60-65%. There are no regional left ventricular wall motion abnormalities. The left ventricular cavity size is normal. There is mildly increased septal and mildly increased posterior left ventricular wall thickness. There is left ventricular concentric remodeling. Abnormal (paradoxical) septal motion consistent with post-operative status. Spectral Doppler shows a normal pattern of left ventricular diastolic filling.  Left Atrium: The left atrium is normal in size.  Right Ventricle: The right ventricle is mildly enlarged. There is normal right ventricular global systolic function.  Right Atrium: The right atrium was not well visualized.  Aortic Valve: The aortic valve is trileaflet. There is mild aortic valve regurgitation. The peak instantaneous gradient of the aortic valve is 6 mmHg.  Mitral Valve: The mitral valve is normal in structure. There is mild mitral annular calcification. There is trace mitral valve regurgitation.  Tricuspid Valve: The tricuspid valve was not well visualized. There is trace tricuspid regurgitation. The right ventricular systolic pressure is unable to be estimated.  Pulmonic Valve: The pulmonic valve is  structurally normal. There is physiologic pulmonic valve regurgitation.  Pericardium: There is no pericardial effusion noted.  Aorta: The aortic root is normal. The ascending aorta was not well visualized. The aortic root appears normal in size and measures 3.70 cm.  Systemic Veins: The inferior vena cava appears normal in size, with IVC inspiratory collapse greater than 50%.  In comparison to the previous echocardiogram(s): Compared with study dated 1/11/2023, on side by side comparison there is no significant difference.       CONCLUSIONS:   1. The left ventricular systolic function is normal, with a visually estimated ejection fraction of 60-65%.   2. Abnormal septal motion consistent with post-operative status.   3. There is normal right ventricular global systolic function.   4. Mildly enlarged right ventricle.   5. Mild aortic valve regurgitation.   6. Normal aortic root.    QUANTITATIVE DATA SUMMARY:     2D MEASUREMENTS:          Normal Ranges:  Ao Root d:       3.70 cm  (2.0-3.7cm)  LAs:             3.70 cm  (2.7-4.0cm)  IVSd:            1.27 cm  (0.6-1.1cm)  LVPWd:           1.12 cm  (0.6-1.1cm)  LVIDd:           4.40 cm  (3.9-5.9cm)  LVIDs:           3.30 cm  LV Mass Index:   84 g/m2  LVEDV Index:     56 ml/m2  LV % FS          25.0 %       LA VOLUME:                    Normal Ranges:  LA Vol A4C:        49.7 ml    (22+/-6mL/m2)  LA Vol A2C:        49.0 ml  LA Vol BP:         50.0 ml  LA Vol Index A4C:  22.0ml/m2  LA Vol Index A2C:  21.7 ml/m2  LA Vol Index BP:   22.1 ml/m2  LA Area A4C:       18.1 cm2  LA Area A2C:       18.2 cm2  LA Major Axis A4C: 5.6 cm  LA Major Axis A2C: 5.8 cm  LA Volume Index:   22.1 ml/m2       AORTA MEASUREMENTS:         Normal Ranges:  Ao Sinus, d:        3.60 cm (2.1-3.5cm)       LV SYSTOLIC FUNCTION BY 2D PLANIMETRY (MOD):                       Normal Ranges:  EF-A4C View:    66 % (>=55%)  EF-A2C View:    60 %  EF-Biplane:     63 %  EF-Visual:      63 %  LV EF Reported: 63  %       LV DIASTOLIC FUNCTION:             Normal Ranges:  MV Peak E:             0.62 m/s    (0.7-1.2 m/s)  MV Peak A:             0.83 m/s    (0.42-0.7 m/s)  E/A Ratio:             0.75        (1.0-2.2)  MV e'                  0.072 m/s   (>8.0)  MV lateral e'          0.07 m/s  MV medial e'           0.07 m/s  MV A Dur:              116.00 msec  E/e' Ratio:            8.58        (<8.0)  MV DT:                 180 msec    (150-240 msec)       MITRAL VALVE:          Normal Ranges:  MV DT:        180 msec (150-240msec)       AORTIC VALVE:           Normal Ranges:  AoV Vmax:      1.19 m/s (<=1.7m/s)  AoV Peak P.7 mmHg (<20mmHg)  LVOT Max Santos:  0.96 m/s (<=1.1m/s)  LVOT VTI:      16.80 cm  LVOT Diameter: 2.10 cm  (1.8-2.4cm)  AoV Area,Vmax: 2.79 cm2 (2.5-4.5cm2)       RIGHT VENTRICLE:  TAPSE: 16.1 mm  RV s'  0.10 m/s       TRICUSPID VALVE/RVSP:         Normal Ranges:  IVC Diam:             1.20 cm       PULMONIC VALVE:          Normal Ranges:  PV Accel Time:  121 msec (>120ms)  PV Max Santos:     0.8 m/s  (0.6-0.9m/s)  PV Max P.5 mmHg       [id number] Derek Pugh MD  Electronically signed on 2025 at 3:14:07 PM       ** Final **  XR chest 2 views  Narrative: Interpreted By:  Evelina Rojas,   STUDY:  XR CHEST 2 VIEWS;  2025 10:19 am      INDICATION:  Signs/Symptoms:pain.          COMPARISON:  Chest radiograph 2023  CT chest 2023      ACCESSION NUMBER(S):  [id number]      ORDERING CLINICIAN:  OCTAVIO REYNOLDS      FINDINGS:  PA and lateral radiographs of the chest were provided.      There are postsurgical changes status post median sternotomy.      CARDIOMEDIASTINAL SILHOUETTE:  Cardiomediastinal silhouette is normal in size and configuration.      LUNGS:  Low lung volumes with bilateral perihilar prominence. There is  otherwise no consolidation, pleural effusion, or pneumothorax.      ABDOMEN:  No remarkable upper abdominal findings.      BONES:  No acute osseous changes.       Impression: Low lung volumes with pulmonary vascular congestion. Otherwise, no  acute airspace disease.      MACRO:  None      Signed by: Evelina Rojas 1/21/2025 10:51 AM  Dictation workstation:   [id number]      Physical Exam  Cardiovascular:      Rate and Rhythm: Normal rate and regular rhythm.      Pulses: Normal pulses.      Heart sounds: Normal heart sounds.   Pulmonary:      Effort: Pulmonary effort is normal.      Breath sounds: Normal breath sounds.   Abdominal:      General: Abdomen is flat.      Palpations: Abdomen is soft.   Skin:     General: Skin is warm and dry.   Neurological:      General: No focal deficit present.      Mental Status: He is oriented to person, place, and time.   Psychiatric:         Mood and Affect: Mood normal.         Behavior: Behavior normal.       Assessment/Plan   Derik Malin is a 61 yo male with PMHx significant for CAD s/p CABG x2 (LIMA to LAD, SVG to OM) in 2/2024, HTN, HLD, and pAFib (no AC, s/p CABG) who presents to [facility] ED on 1/21/25 with chest pain.     Updates 01/22/25   - plan for stress test today   - continue aspirin, statin, coreg and norvasc  - will uptitrate coreg as tolerated    #Stable Angina  ::Troponin: 5  ::BNP: 12  ::D-Dimer 523 -> age corrected normal  ::presented at rest, relieved with nitroglycerin  ::previous CABG only addressed left sided lesions  - continue 81mg aspirin every day  - OP Note from 2/12/24 noted significant disease of the right coronary circulation. However, the PDA seemed to be a small target and the ventricular branch of the right was also small and had severe disease in it.   - stress test today      #HTN  #HLD  - continue amlodipine 5mg every day  - starting coreg 6.25mg BID  - starting atorvastatin 80mg every day     F: PRN  E: PRN  N: adult cardiac  A: PIV  DVT: lovenox 40mg     Shreyas Nuñez DO

## 2025-01-22 NOTE — CARE PLAN
The patient's goals for the shift include      The clinical goals for the shift include CP free    Over the shift, the patient did  make progress toward the following goals. Barriers to progression include none. Recommendations to address these barriers include stress test .  Stress test normal . Pt will be discharged on a few new meds. Ready for discharge when meds to beds are delivered

## 2025-01-22 NOTE — DISCHARGE INSTRUCTIONS
Dear Derik Malin,    You were hospitalized here at [facility] on 1/21/25 after experiencing shortness of breath and chest pain. Your blood work did not show any concerning findings, and your pain resolved after rest and nitroglycerin tablets. We had you completed a walking stress test on 1/22/25, and this test did not show any concern for lack of blood flow. We are increasing your home blood pressure medications that will also benefit your heart long-term.     Your medications changes and follow-up appointments are listed below in detail.  Referrals have been entered for your follow-up appointments.  You can schedule all follow-up appointments through the [email or web address], or you can call the  central scheduling line at [phone].  Please be sure to bring your discharge paperwork to all follow-up appointments.    If at any time you start developing chest pain, or shortness of breath, you should immediately return to the emergency room or call 911.    It was a pleasure taking care of you here at [facility].  We wish you a speedy recovery.    Sincerely,  Your  Medicine Team      Medication Changes:  - START TAKING   - coreg 12.5 mg BID   - losartan 12.5mg QD  - Continue taking   - aspirin 81mg daily   - atorvastatin 80mg every day   - amlodipine 5mg every day   - nitroglycerin tablets only if you are experiencing chest pain

## 2025-01-22 NOTE — HOSPITAL COURSE
Derik Malin is a 60 y.o. male with PMH CAD s/p CABG x2 (LIMA to LAD, SVG to OM) in 2/2024, HTN, HLD, and pAFib (no AC) who presents to [facility] ED on 1/21/25 with right arm and shoulder pain. This pain is noted to continue down his arm. This pain started yesterday, but he didn't really take note until this morning. This morning, he was sitting at his desk at home and stated that his pain was increasing to 8/10. This pain was bothering enough that he decided to come in. After receiving nitroglycerin, he reports that this pain decreased to 6/10.      He reports that he only takes amlodipine 5mg daily at home.      He denies weakness, shortness of breath, NV, or fever.     TTE 1/21/25   1. The left ventricular systolic function is normal, with a visually estimated ejection fraction of 60-65%.   2. Abnormal septal motion consistent with post-operative status.   3. There is normal right ventricular global systolic function.   4. Mildly enlarged right ventricle.   5. Mild aortic valve regurgitation.   6. Normal aortic root.    On 1/22, patient went for a walking stress test that had the following results    1. No evidence of stress-induced ischemia or prior infarction.  2. The left ventricle is normal in size.  3. Normal LV wall motion with a borderline decreased LV EF estimated  at 46%.    The decision was made to medically optimize and plan for discharge. We increased coreg to 12.5mg BID, started losartan 25mg every day, continued aspirin, atorvastatin and norvasc, and added PRN nitroglycerin tablets. We discussed the discharge plan with patient, and he is comfortable with discharge and cardiology follow up.

## 2025-01-22 NOTE — PROGRESS NOTES
Physical Therapy    Physical Therapy Evaluation    Patient Name: Derik Malin  MRN: [id number]  Department: [place] [address]  Room: [address] [place] [address]  Today's Date: 1/22/2025   Time Calculation  Start Time: 1505  Stop Time: 1515  Time Calculation (min): 10 min    Assessment/Plan   PT Assessment  Barriers to Discharge Home: No anticipated barriers  Medical Staff Made Aware: Yes  End of Session Communication: Bedside nurse  Assessment Comment: Patient is a 61yo M presenting with chest pain. Patient is indep at baseline. Patient reports possible dc tomorrow. Patient is at baseline, no further acute PT needs.  End of Session Patient Position: Up in chair, Alarm off, not on at start of session  IP OR SWING BED PT PLAN  Inpatient or Swing Bed: Inpatient  PT Plan  PT Plan: PT Eval only  PT Eval Only Reason: At baseline function  PT Frequency: PT eval only  PT Discharge Recommendations: No further acute PT  PT - OK to Discharge: Yes    Subjective   General Visit Information:  General  Reason for Referral: chest pain  Past Medical History Relevant to Rehab: CAD s/p CABG x2 (LIMA to LAD, SVG to OM) in 2/2024, HTN, HLD, and pAFib (no AC, s/p CABG)  Prior to Session Communication: Bedside nurse  Patient Position Received: Bed, 3 rail up, Alarm off, not on at start of session  General Comment: pt supine in bed, RN cleared. pleasant and cooperative.  Home Living:  Home Living  Type of Home: House  Lives With: Spouse  Home Adaptive Equipment: Walker rolling or standard, Cane  Home Layout: Bed/bath upstairs, Full bath main level  Home Access: Stairs to enter with rails  Entrance Stairs-Number of Steps: 4  Bathroom Shower/Tub: Tub/shower unit  Bathroom Equipment: Grab bars in shower, Shower chair with back  Prior Level of Function:  Prior Function Per Pt/Caregiver Report  Level of [place]: Independent with ADLs and functional transfers, Independent with homemaking with ambulation  ADL Assistance: Independent  Homemaking Assistance:  Independent  Ambulatory Assistance: Independent  Prior Function Comments: + drive, + work at [employer]  Precautions:  Precautions  Medical Precautions: Fall precautions      Date/Time Vitals Session Patient Position Pulse Resp SpO2 BP MAP (mmHg)    01/22/25 1500 --  --  79  18  96 %  129/79  96     01/22/25 1505 --  --  78  --  99 %  --  --                 Objective   Pain:  Pain Assessment  Pain Assessment: 0-10  0-10 (Numeric) Pain Score: 0 - No pain  Cognition:  Cognition  Overall Cognitive Status: Within Functional Limits    General Assessments:     Activity Tolerance  Endurance: Endurance does not limit participation in activity    Sensation  Light Touch: No apparent deficits       Functional Assessments:  Bed Mobility  Bed Mobility: Yes  Bed Mobility 1  Bed Mobility 1: Supine to sitting  Level of Assistance 1: Independent    Transfers  Transfer: Yes  Transfer 1  Transfer From 1: Sit to, Stand to  Transfer to 1: Stand, Sit  Technique 1: Sit to stand, Stand to sit  Transfer Level of Assistance 1: Distant supervision  Trials/Comments 1: stood from EOB    Ambulation/Gait Training  Ambulation/Gait Training Performed: Yes  Ambulation/Gait Training 1  Surface 1: Level tile  Device 1: No device  Assistance 1: Distant supervision  Comments/Distance (ft) 1: ambulated > 220', no LOB or unsteadiness, VSS  Extremity/Trunk Assessments:  RLE   RLE : Within Functional Limits  LLE   LLE : Within Functional Limits  Outcome Measures:  [facility] Basic Mobility  Turning from your back to your side while in a flat bed without using bedrails: None  Moving from lying on your back to sitting on the side of a flat bed without using bedrails: None  Moving to and from bed to chair (including a wheelchair): None  Standing up from a chair using your arms (e.g. wheelchair or bedside chair): A little  To walk in hospital room: A little  Climbing 3-5 steps with railing: A little  Basic Mobility - Total Score: 21    Education  Documentation  Precautions, taught by Arleth Rangel PT at 1/22/2025  3:23 PM.  Learner: Patient  Readiness: Acceptance  Method: Explanation  Response: Verbalizes Understanding  Comment: edu on role of PT, dc plan and safety    Mobility Training, taught by Arleth Rangel PT at 1/22/2025  3:23 PM.  Learner: Patient  Readiness: Acceptance  Method: Explanation  Response: Verbalizes Understanding  Comment: edu on role of PT, dc plan and safety    Education Comments  No comments found.

## 2025-01-22 NOTE — CARE PLAN
The patient's goals for the shift include  lab values WNL during shift    The clinical goals for the shift include Patient to be chest pain < 5/10 throughout shift.    Over the shift, the patient did not make progress toward the following goals.       Problem: ACS/CP/NSTEMI/STEMI  Goal: Lab values return to normal range  Outcome: Progressing  Goal: Serial ECG will return to baseline  Outcome: Progressing

## 2025-01-22 NOTE — DISCHARGE SUMMARY
Discharge Diagnosis  Stable Angina    Issues Requiring Follow-Up  Stable Angina  Hypertension  Hyperlipidemia    Discharge Meds     Medication List      START taking these medications     aspirin 81 mg chewable tablet; Chew 1 tablet (81 mg) once daily. Do not   fill before January 23, 2025.; Start taking on: January 23, 2025   carvedilol 12.5 mg tablet; Commonly known as: Coreg; Take 1 tablet (12.5   mg) by mouth 2 times a day.   losartan 25 mg tablet; Commonly known as: Cozaar; Take 1 tablet (25 mg)   by mouth once daily.   nitroglycerin 0.4 mg SL tablet; Commonly known as: Nitrostat; Place 1   tablet (0.4 mg) under the tongue every 5 minutes if needed for chest pain.     CONTINUE taking these medications     amLODIPine 5 mg tablet; Commonly known as: Norvasc; Take 1 tablet (5 mg)   by mouth once daily.   atorvastatin 80 mg tablet; Commonly known as: Lipitor; Take 1 tablet (80   mg) by mouth once daily.   blood pressure monitor kit; Commonly known as: Blood Pressure Kit; 1 kit   3 (three) times a week.       Test Results Pending At Discharge  Pending Labs       No current pending labs.            Hospital Course  Derik Malin is a 60 y.o. male with PMH CAD s/p CABG x2 (LIMA to LAD, SVG to OM) in 2/2024, HTN, HLD, and pAFib (no AC) who presents to [facility] ED on 1/21/25 with right arm and shoulder pain. This pain is noted to continue down his arm. This pain started yesterday, but he didn't really take note until this morning. This morning, he was sitting at his desk at home and stated that his pain was increasing to 8/10. This pain was bothering enough that he decided to come in. After receiving nitroglycerin, he reports that this pain decreased to 6/10.      He reports that he only takes amlodipine 5mg daily at home.      He denies weakness, shortness of breath, NV, or fever.     TTE 1/21/25   1. The left ventricular systolic function is normal, with a visually estimated ejection fraction of 60-65%.   2. Abnormal  septal motion consistent with post-operative status.   3. There is normal right ventricular global systolic function.   4. Mildly enlarged right ventricle.   5. Mild aortic valve regurgitation.   6. Normal aortic root.    On 1/22, patient went for a walking stress test that had the following results    1. No evidence of stress-induced ischemia or prior infarction.  2. The left ventricle is normal in size.  3. Normal LV wall motion with a borderline decreased LV EF estimated  at 46%.    The decision was made to medically optimize and plan for discharge. We increased coreg to 12.5mg BID, started losartan 25mg every day, continued aspirin, atorvastatin and norvasc, and added PRN nitroglycerin tablets. We discussed the discharge plan with patient, and he is comfortable with discharge and cardiology follow up.       Pertinent Physical Exam At Time of Discharge  Physical Exam  Constitutional:       Appearance: Normal appearance.   Cardiovascular:      Rate and Rhythm: Normal rate and regular rhythm.      Pulses: Normal pulses.      Heart sounds: Normal heart sounds.   Pulmonary:      Effort: Pulmonary effort is normal.      Breath sounds: Normal breath sounds.   Abdominal:      General: Abdomen is flat.      Palpations: Abdomen is soft.   Musculoskeletal:         General: Normal range of motion.   Skin:     General: Skin is warm and dry.   Neurological:      General: No focal deficit present.      Mental Status: He is alert and oriented to person, place, and time.   Psychiatric:         Mood and Affect: Mood normal.         Behavior: Behavior normal.         Shreyas Nuñez DO

## 2025-01-22 NOTE — PROGRESS NOTES
01/22/25 0859   Discharge Planning   Living Arrangements Spouse/significant other   Support Systems Spouse/significant other   Assistance Needed none   Type of Residence Private residence   Number of Stairs to Enter Residence 4   Number of Stairs Within Residence 6   Do you have animals or pets at home? No   Who is requesting discharge planning? Provider   Home or Post Acute Services None   Expected Discharge Disposition Home   Does the patient need discharge transport arranged? No   Financial Resource Strain   How hard is it for you to pay for the very basics like food, housing, medical care, and heating? Not very   Housing Stability   In the last 12 months, was there a time when you were not able to pay the mortgage or rent on time? N   In the past 12 months, how many times have you moved where you were living? 1   At any time in the past 12 months, were you homeless or living in a shelter (including now)? N   Transportation Needs   In the past 12 months, has lack of transportation kept you from medical appointments or from getting medications? no   In the past 12 months, has lack of transportation kept you from meetings, work, or from getting things needed for daily living? No   Patient Choice   Provider Choice list and CMS website (https://medicare.gov/care-compare#search) for post-acute Quality and Resource Measure Data were provided and reviewed with: Patient   Patient / Family choosing to utilize agency / facility established prior to hospitalization No   Stroke Family Assessment   Stroke Family Assessment Needed No     Transitional Care Coordination Progress Note:  Patient discussed during interdisciplinary rounds.   Team members present: RN CHAPIN ORR MD   Plan per Medical/Surgical team: Possible ischemic eval  and CTS eval   Payor: [facility]   Discharge disposition: Home   Potential Barriers: None  ADOD: 1-      Previous Home Care: None   DME: Cane   Pharmacy: [facility]  Falls: None    Dialysis: None

## 2025-01-22 NOTE — PROGRESS NOTES
Occupational Therapy                 Therapy Communication Note    Patient Name: Derik Malin  MRN: [id number]  Department: [facility] [id number]  Room: [address] [place] [address]  Today's Date: 1/22/2025     Discipline: Occupational Therapy    OT Missed Visit: Yes     Missed Visit Reason: Missed Visit Reason: Patient in a medical procedure    Missed Time: Attempt    Comment:   no

## 2025-04-22 ENCOUNTER — CLINICAL SUPPORT (OUTPATIENT)
Dept: EMERGENCY MEDICINE | Facility: HOSPITAL | Age: 60
DRG: 303 | End: 2025-04-22
Payer: MEDICARE

## 2025-04-22 ENCOUNTER — APPOINTMENT (OUTPATIENT)
Dept: RADIOLOGY | Facility: HOSPITAL | Age: 60
DRG: 303 | End: 2025-04-22
Payer: MEDICARE

## 2025-04-22 ENCOUNTER — HOSPITAL ENCOUNTER (INPATIENT)
Facility: HOSPITAL | Age: 60
LOS: 3 days | Discharge: HOME | DRG: 303 | End: 2025-04-26
Attending: EMERGENCY MEDICINE | Admitting: EMERGENCY MEDICINE
Payer: MEDICARE

## 2025-04-22 DIAGNOSIS — I20.0 UNSTABLE ANGINA (MULTI): ICD-10-CM

## 2025-04-22 DIAGNOSIS — I10 HYPERTENSION, UNSPECIFIED TYPE: ICD-10-CM

## 2025-04-22 DIAGNOSIS — I25.118 CORONARY ARTERY DISEASE OF NATIVE HEART WITH STABLE ANGINA PECTORIS, UNSPECIFIED VESSEL OR LESION TYPE: ICD-10-CM

## 2025-04-22 DIAGNOSIS — K30 DELAYED GASTRIC EMPTYING: ICD-10-CM

## 2025-04-22 DIAGNOSIS — R07.9 CHEST PAIN, UNSPECIFIED TYPE: Primary | ICD-10-CM

## 2025-04-22 LAB
ALBUMIN SERPL BCP-MCNC: 4.4 G/DL (ref 3.4–5)
ALP SERPL-CCNC: 66 U/L (ref 33–136)
ALT SERPL W P-5'-P-CCNC: 66 U/L (ref 10–52)
ANION GAP SERPL CALC-SCNC: 12 MMOL/L (ref 10–20)
AST SERPL W P-5'-P-CCNC: 42 U/L (ref 9–39)
BASOPHILS # BLD AUTO: 0.03 X10*3/UL (ref 0–0.1)
BASOPHILS NFR BLD AUTO: 0.7 %
BILIRUB SERPL-MCNC: 1 MG/DL (ref 0–1.2)
BNP SERPL-MCNC: 9 PG/ML (ref 0–99)
BUN SERPL-MCNC: 13 MG/DL (ref 6–23)
CALCIUM SERPL-MCNC: 9.4 MG/DL (ref 8.6–10.6)
CARDIAC TROPONIN I PNL SERPL HS: 6 NG/L (ref 0–53)
CARDIAC TROPONIN I PNL SERPL HS: 6 NG/L (ref 0–53)
CHLORIDE SERPL-SCNC: 106 MMOL/L (ref 98–107)
CO2 SERPL-SCNC: 25 MMOL/L (ref 21–32)
CREAT SERPL-MCNC: 0.88 MG/DL (ref 0.5–1.3)
EGFRCR SERPLBLD CKD-EPI 2021: >90 ML/MIN/1.73M*2
EOSINOPHIL # BLD AUTO: 0.15 X10*3/UL (ref 0–0.7)
EOSINOPHIL NFR BLD AUTO: 3.6 %
ERYTHROCYTE [DISTWIDTH] IN BLOOD BY AUTOMATED COUNT: 12.9 % (ref 11.5–14.5)
GLUCOSE SERPL-MCNC: 85 MG/DL (ref 74–99)
HCT VFR BLD AUTO: 40.7 % (ref 41–52)
HGB BLD-MCNC: 13.6 G/DL (ref 13.5–17.5)
IMM GRANULOCYTES # BLD AUTO: 0.01 X10*3/UL (ref 0–0.7)
IMM GRANULOCYTES NFR BLD AUTO: 0.2 % (ref 0–0.9)
LYMPHOCYTES # BLD AUTO: 1.79 X10*3/UL (ref 1.2–4.8)
LYMPHOCYTES NFR BLD AUTO: 43.1 %
MCH RBC QN AUTO: 28.6 PG (ref 26–34)
MCHC RBC AUTO-ENTMCNC: 33.4 G/DL (ref 32–36)
MCV RBC AUTO: 86 FL (ref 80–100)
MONOCYTES # BLD AUTO: 0.5 X10*3/UL (ref 0.1–1)
MONOCYTES NFR BLD AUTO: 12 %
NEUTROPHILS # BLD AUTO: 1.67 X10*3/UL (ref 1.2–7.7)
NEUTROPHILS NFR BLD AUTO: 40.4 %
NRBC BLD-RTO: 0 /100 WBCS (ref 0–0)
PLATELET # BLD AUTO: 236 X10*3/UL (ref 150–450)
POTASSIUM SERPL-SCNC: 3.7 MMOL/L (ref 3.5–5.3)
PROT SERPL-MCNC: 7.9 G/DL (ref 6.4–8.2)
RBC # BLD AUTO: 4.75 X10*6/UL (ref 4.5–5.9)
SODIUM SERPL-SCNC: 139 MMOL/L (ref 136–145)
WBC # BLD AUTO: 4.2 X10*3/UL (ref 4.4–11.3)

## 2025-04-22 PROCEDURE — 36415 COLL VENOUS BLD VENIPUNCTURE: CPT

## 2025-04-22 PROCEDURE — 99285 EMERGENCY DEPT VISIT HI MDM: CPT | Mod: 25 | Performed by: EMERGENCY MEDICINE

## 2025-04-22 PROCEDURE — 2500000004 HC RX 250 GENERAL PHARMACY W/ HCPCS (ALT 636 FOR OP/ED): Mod: JZ

## 2025-04-22 PROCEDURE — 93308 TTE F-UP OR LMTD: CPT

## 2025-04-22 PROCEDURE — 93005 ELECTROCARDIOGRAM TRACING: CPT

## 2025-04-22 PROCEDURE — 84484 ASSAY OF TROPONIN QUANT: CPT | Performed by: EMERGENCY MEDICINE

## 2025-04-22 PROCEDURE — 99223 1ST HOSP IP/OBS HIGH 75: CPT

## 2025-04-22 PROCEDURE — 96375 TX/PRO/DX INJ NEW DRUG ADDON: CPT | Mod: 59

## 2025-04-22 PROCEDURE — 84484 ASSAY OF TROPONIN QUANT: CPT

## 2025-04-22 PROCEDURE — 93010 ELECTROCARDIOGRAM REPORT: CPT

## 2025-04-22 PROCEDURE — G0378 HOSPITAL OBSERVATION PER HR: HCPCS

## 2025-04-22 PROCEDURE — 71046 X-RAY EXAM CHEST 2 VIEWS: CPT | Mod: FOREIGN READ | Performed by: RADIOLOGY

## 2025-04-22 PROCEDURE — 83880 ASSAY OF NATRIURETIC PEPTIDE: CPT

## 2025-04-22 PROCEDURE — 71046 X-RAY EXAM CHEST 2 VIEWS: CPT

## 2025-04-22 PROCEDURE — 2500000001 HC RX 250 WO HCPCS SELF ADMINISTERED DRUGS (ALT 637 FOR MEDICARE OP)

## 2025-04-22 PROCEDURE — 80053 COMPREHEN METABOLIC PANEL: CPT

## 2025-04-22 PROCEDURE — 2500000005 HC RX 250 GENERAL PHARMACY W/O HCPCS

## 2025-04-22 PROCEDURE — 85025 COMPLETE CBC W/AUTO DIFF WBC: CPT

## 2025-04-22 PROCEDURE — 96374 THER/PROPH/DIAG INJ IV PUSH: CPT | Mod: 59

## 2025-04-22 PROCEDURE — 93308 TTE F-UP OR LMTD: CPT | Performed by: SURGERY

## 2025-04-22 RX ORDER — METOPROLOL TARTRATE 1 MG/ML
5 INJECTION, SOLUTION INTRAVENOUS ONCE AS NEEDED
Status: DISCONTINUED | OUTPATIENT
Start: 2025-04-22 | End: 2025-04-23

## 2025-04-22 RX ORDER — LOSARTAN POTASSIUM 25 MG/1
25 TABLET ORAL DAILY
Status: DISCONTINUED | OUTPATIENT
Start: 2025-04-22 | End: 2025-04-25

## 2025-04-22 RX ORDER — NITROGLYCERIN 0.4 MG/1
0.8 TABLET SUBLINGUAL ONCE
Status: COMPLETED | OUTPATIENT
Start: 2025-04-22 | End: 2025-04-23

## 2025-04-22 RX ORDER — METOPROLOL TARTRATE 100 MG/1
100 TABLET ORAL ONCE
Status: DISCONTINUED | OUTPATIENT
Start: 2025-04-22 | End: 2025-04-23

## 2025-04-22 RX ORDER — METOPROLOL TARTRATE 1 MG/ML
5 INJECTION, SOLUTION INTRAVENOUS ONCE
Status: COMPLETED | OUTPATIENT
Start: 2025-04-22 | End: 2025-04-23

## 2025-04-22 RX ORDER — LIDOCAINE 560 MG/1
1 PATCH PERCUTANEOUS; TOPICAL; TRANSDERMAL DAILY
Status: DISCONTINUED | OUTPATIENT
Start: 2025-04-22 | End: 2025-04-26 | Stop reason: HOSPADM

## 2025-04-22 RX ORDER — KETOROLAC TROMETHAMINE 15 MG/ML
15 INJECTION, SOLUTION INTRAMUSCULAR; INTRAVENOUS ONCE
Status: COMPLETED | OUTPATIENT
Start: 2025-04-22 | End: 2025-04-22

## 2025-04-22 RX ORDER — MORPHINE SULFATE 4 MG/ML
4 INJECTION INTRAVENOUS ONCE
Status: COMPLETED | OUTPATIENT
Start: 2025-04-22 | End: 2025-04-22

## 2025-04-22 RX ORDER — ATORVASTATIN CALCIUM 80 MG/1
80 TABLET, FILM COATED ORAL DAILY
Status: DISCONTINUED | OUTPATIENT
Start: 2025-04-22 | End: 2025-04-26 | Stop reason: HOSPADM

## 2025-04-22 RX ORDER — METOPROLOL TARTRATE 1 MG/ML
5 INJECTION, SOLUTION INTRAVENOUS ONCE AS NEEDED
Status: COMPLETED | OUTPATIENT
Start: 2025-04-22 | End: 2025-04-23

## 2025-04-22 RX ORDER — LORAZEPAM 2 MG/ML
0.5 INJECTION INTRAMUSCULAR EVERY 5 MIN PRN
Status: DISCONTINUED | OUTPATIENT
Start: 2025-04-22 | End: 2025-04-23

## 2025-04-22 RX ORDER — NAPROXEN SODIUM 220 MG/1
324 TABLET, FILM COATED ORAL ONCE
Status: COMPLETED | OUTPATIENT
Start: 2025-04-22 | End: 2025-04-22

## 2025-04-22 RX ORDER — CARVEDILOL 12.5 MG/1
12.5 TABLET ORAL 2 TIMES DAILY
Status: DISCONTINUED | OUTPATIENT
Start: 2025-04-22 | End: 2025-04-26 | Stop reason: HOSPADM

## 2025-04-22 RX ORDER — METOPROLOL TARTRATE 100 MG/1
100 TABLET ORAL ONCE AS NEEDED
Status: DISCONTINUED | OUTPATIENT
Start: 2025-04-22 | End: 2025-04-23

## 2025-04-22 RX ADMIN — MORPHINE SULFATE 4 MG: 4 INJECTION, SOLUTION INTRAMUSCULAR; INTRAVENOUS at 15:48

## 2025-04-22 RX ADMIN — ASPIRIN 324 MG: 81 TABLET, CHEWABLE ORAL at 15:48

## 2025-04-22 RX ADMIN — KETOROLAC TROMETHAMINE 15 MG: 15 INJECTION, SOLUTION INTRAMUSCULAR; INTRAVENOUS at 16:53

## 2025-04-22 RX ADMIN — LIDOCAINE PAIN RELIEF 1 PATCH: 560 PATCH TOPICAL at 16:53

## 2025-04-22 ASSESSMENT — COLUMBIA-SUICIDE SEVERITY RATING SCALE - C-SSRS
2. HAVE YOU ACTUALLY HAD ANY THOUGHTS OF KILLING YOURSELF?: NO
1. IN THE PAST MONTH, HAVE YOU WISHED YOU WERE DEAD OR WISHED YOU COULD GO TO SLEEP AND NOT WAKE UP?: NO
6. HAVE YOU EVER DONE ANYTHING, STARTED TO DO ANYTHING, OR PREPARED TO DO ANYTHING TO END YOUR LIFE?: NO

## 2025-04-22 ASSESSMENT — PAIN - FUNCTIONAL ASSESSMENT
PAIN_FUNCTIONAL_ASSESSMENT: 0-10

## 2025-04-22 ASSESSMENT — LIFESTYLE VARIABLES
EVER HAD A DRINK FIRST THING IN THE MORNING TO STEADY YOUR NERVES TO GET RID OF A HANGOVER: NO
TOTAL SCORE: 0
HAVE YOU EVER FELT YOU SHOULD CUT DOWN ON YOUR DRINKING: NO
HAVE PEOPLE ANNOYED YOU BY CRITICIZING YOUR DRINKING: NO
EVER FELT BAD OR GUILTY ABOUT YOUR DRINKING: NO

## 2025-04-22 ASSESSMENT — PAIN SCALES - GENERAL
PAINLEVEL_OUTOF10: 2
PAINLEVEL_OUTOF10: 2
PAINLEVEL_OUTOF10: 8

## 2025-04-22 ASSESSMENT — PAIN DESCRIPTION - PROGRESSION: CLINICAL_PROGRESSION: NOT CHANGED

## 2025-04-22 ASSESSMENT — PAIN DESCRIPTION - DESCRIPTORS: DESCRIPTORS: ACHING

## 2025-04-22 NOTE — ED PROVIDER NOTES
Emergency Department Provider Note        History of Present Illness     History provided by: Patient  Limitations to History: None  External Records Reviewed with Brief Summary: I reviewed prior ED visits, Care Everywhere, discharge summaries and outpatient records as appropriate.       HPI:  Derik Malin is a 60 y.o. male HTN, HLD, CAD s/p CABG in  presenting to the emergency department with acute onset chest pain that started shortly prior to arrival.  Describes it as left-sided substernal chest pain without radiation.  Reports he was at rest when the patient started.  He took 2 sublingual nitroglycerin tablets prior to arrival without change in symptoms, is endorsing 6 out of 10 pain that is nonradiating.  Denies affiliated diaphoresis, lightheadedness or headache.  No shortness of breath, no dyspnea on exertion, leg pain or swelling, no infectious symptoms.  Has some partially reproducible chest pain over the right chest wall but no other acute symptoms.  No lower extremity tenderness or edema.    Physical Exam   Triage vitals:  T 36.5 °C (97.7 °F)  HR 77  BP (!) 174/100  RR 15  O2 98 % None (Room air)    General: Awake, alert, in no acute distress  Eyes: Gaze conjugate.  No scleral icterus or injection  HENT: Normo-cephalic, atraumatic. No stridor  CV: Regular rate, regular rhythm. Radial pulses 2+ bilaterally, slight reproducible tenderness over the right chest wall, no radiating pain.  Resp: Breathing non-labored, speaking in full sentences.  Clear to auscultation bilaterally  GI: Soft, non-distended, non-tender. No rebound or guarding.  MSK/Extremities: No gross bony deformities. Moving all extremities  Skin: Warm. Appropriate color  Neuro: Alert. Oriented. Face symmetric. Speech is fluent.  Gross strength and sensation intact in b/l UE and LEs  Psych: Appropriate mood and affect    Medical Decision Making & ED Course   Medical Decision Makin y.o. male presenting to the emergency department  with acute onset chest pain at rest.  He has extensive cardiac history and despite taking 2 sublingual nitroglycerin he is still endorsing a 6 out of 10 pain in his chest.  He is hemodynamically stable and otherwise nontoxic-appearing.  Patient given aspirin, cardiopulmonary workup initiated.  Also provided him with some morphine for pain relief as well as a lidocaine patch as he is also endorsing some reproducible chest wall tenderness.  Blood work obtained and reviewed, he is no elevated troponin or BNP, his CBC and CMP are otherwise unremarkable aside from some slight elevation in his LFTs.  Bedside ultrasound does not show Evidence of acute right heart strain, pericardial effusion or wall motion abnormality.  His EKG does not show signs of STEMI and his chest x-ray unremarkable.  Reviewed patient's chart and he had an echo done in January that showed preserved ejection fraction however he has known history of severe vessel disease and discussed with patient that he would benefit for admission for cardiac risk stratification and undergo CT angio of his coronaries.  He has a heart score of 4.  Patient is agreeable with this plan.  Patient admitted to CDU for observation and further management.  ----     Social Determinants of Health which Significantly Impact Care: None identified     EKG Independent Interpretation: EKG interpreted by myself. Please see ED Course for full interpretation.    Independent Result Review and Interpretation: Relevant laboratory and radiographic results were reviewed and independently interpreted by myself.  As necessary, they are commented on in the ED Course.    Chronic conditions affecting the patient's care: As documented above in [facility]    The patient was discussed with the following consultants/services: None    Care Considerations: As documented above in [facility]    ED Course:  ED Course as of 04/23/25 0118   e Apr 22, 2025   1515 EKG performed at 1503, interpreted by me.  Normal sinus  rhythm with rate of 77, right bundle branch block.  No ST elevation or depression, no acute T wave normalities.  Normal axis, normal intervals.  When compared to EKG from January 2025, EKG today is unchanged [PW]      ED Course User Index  [PW] Nesha Gracia DO         Diagnoses as of 04/23/25 0118   Chest pain, unspecified type     Disposition   As a result of their workup, the patient will require admission to the hospital.  The patient was informed of his diagnosis.  The patient was given the opportunity to ask questions and I answered them. The patient agreed to be admitted to the hospital.    Procedures   Procedures    Patient seen and discussed with ED attending physician.    Nesha Gracia DO  Emergency Medicine       Nesha Gracia DO  Resident  04/23/25 0118

## 2025-04-22 NOTE — PROCEDURES
Performed by: Virgen Hirsch MD  Authorized by: Shreyas Salguero MD    Cardiac Indications: chest pain                  Procedure: Cardiac Ultrasound    Findings:   Views: parasternal long, parasternal short and apical four  The pericardial space was visualized and was NEGATIVE for a significant pericardial effusion.    LV: LV systolic function was NORMAL.  RV: RV size was NORMAL.    Impression:  Cardiac: The focused cardiac ultrasound exam was NORMAL.

## 2025-04-22 NOTE — LETTER
April 26, 2025     Patient: Derik Malin   YOB: 1965   Date of Visit: 4/22/2025- 4/26/2025       To Whom It May Concern:    Derik Malin was seen at [facility] from April 22nd until April 26th. Please excuse Derik for his absence from work on these days. He is cleared to return to work on 4/29/2025.     If you have any questions or concerns, please don't hesitate to call.         Sincerely,     Brittany Arriaga, APRN-CNP    [phone]

## 2025-04-22 NOTE — ED TRIAGE NOTES
Pt arrived for worsening chest pain today. Pt two of his own nitroglycerin tabs, states it helped relieve the chest pain. Pt has history of heart failure with valve replacement last year.

## 2025-04-22 NOTE — H&P
History and Physical  [facility] EMERGENCY MEDICINE  Patient: Derik Malin  MRN: [id number]  : 1965  Date of Evaluation: 2025  ED Provider: Swetha Rivera PA-C      Patient History:  Derik Malin is a 60 y.o. male with a past medical history significant for hypertension, hyperlipidemia, CAD s/p CABG x2 (LIMA to LAD, SVG to OM) in 2024, and pAFib (no anticoagulation) who presents to the emergency department complaining of chest pain.  Patient states that he is employed at the  at the [employer].  Yesterday afternoon he was at work when he developed sharp, left-sided substernal chest pains.  Denies any radiation.  Last evening his symptoms improved on their own.  Earlier today, his chest pain returned.  He took 2 sublingual nitroglycerin tablets, and only experienced minimal improvement.  He denies any associated headache, dizziness, lightheadedness or diaphoresis.  Denies shortness of breath, dyspnea on exertion or leg pain.  Denies fevers or chills.    ED team obtained basic lab work.  CBC was unremarkable, no anemia or leukocytosis.  CMP showed normal renal hepatic function without any electrolyte disturbances.  BNP was normal.  Troponin was not elevated.  EKG shows no evidence of STEMI or acute ischemia.  Chest x-ray showed no evidence of consolidating infiltrate or effusion.  Cardiac POCUS showed normal LV systolic function. ED team administered Toradol, morphine, aspirin and a lidocaine patch for pain.  Patient was recently admitted on 2025 for unstable angina and had a TTE and nuclear stress test.  He was admitted to the CDU for coronary CTA.     Upon admission of the Clinical Decision Unit, patient is lying in bed comfortably, he is not ill-appearing, nondiaphoretic and in no acute distress. He reports that his pain has improved here in the ED after receiving medications.  Vitals are stable.  Ordered patient's home medications.  Plan for coronary CTA in the  AM    Limitations to history: None  Independent Historian: Patient  External Records Reviewed: ED note    The acute evaluation included:  Orders Placed This Encounter   Procedures    XR chest 2 views    Point of Care Ultrasound    CT angio coronary art with heartflow if score >30%    CBC and Auto Differential    Comprehensive metabolic panel    Troponin I Series, High Sensitivity (0, 1 HR)    B-Type Natriuretic Peptide    Troponin I, High Sensitivity, Initial    Troponin, High Sensitivity, 1 Hour    NPO Diet Except: Other (specify); Additional Details: Clear liquids until 0500. May have clears up to 2 hours prior to procedure if exact time is known.; Effective midnight    Adult diet Cardiac; 70 gm fat; 2 - 3 grams Sodium    Nursing communication for Metoprolol/Labetalol dosing    ECG 12 lead    Initiate Observation Send to CDU       I reviewed the below labs and imaging as ordered by the ED provider:  XR chest 2 views   Final Result   No evidence of consolidating infiltrate or effusion.   Signed by Milton Hernandez MD      Point of Care Ultrasound         CT angio coronary art with heartflow if score >30%    (Results Pending)       Labs Reviewed   CBC WITH AUTO DIFFERENTIAL - Abnormal       Result Value    WBC 4.2 (*)     nRBC 0.0      RBC 4.75      Hemoglobin 13.6      Hematocrit 40.7 (*)     MCV 86      MCH 28.6      MCHC 33.4      RDW 12.9      Platelets 236      Neutrophils % 40.4      Immature Granulocytes %, Automated 0.2      Lymphocytes % 43.1      Monocytes % 12.0      Eosinophils % 3.6      Basophils % 0.7      Neutrophils Absolute 1.67      Immature Granulocytes Absolute, Automated 0.01      Lymphocytes Absolute 1.79      Monocytes Absolute 0.50      Eosinophils Absolute 0.15      Basophils Absolute 0.03     COMPREHENSIVE METABOLIC PANEL - Abnormal    Glucose 85      Sodium 139      Potassium 3.7      Chloride 106      Bicarbonate 25      Anion Gap 12      Urea Nitrogen 13      Creatinine 0.88      eGFR >90       Calcium 9.4      Albumin 4.4      Alkaline Phosphatase 66      Total Protein 7.9      AST 42 (*)     Bilirubin, Total 1.0      ALT 66 (*)    B-TYPE NATRIURETIC PEPTIDE - Normal    BNP 9      Narrative:        <100 pg/mL - Heart failure unlikely  100-299 pg/mL - Intermediate probability of acute heart                  failure exacerbation. Correlate with clinical                  context and patient history.    >=300 pg/mL - Heart Failure likely. Correlate with clinical                  context and patient history.     Biotin interference may cause falsely decreased results. Patients taking a Biotin dose of up to 5 mg/day should refrain from taking Biotin for 24 hours before sample  collection. Providers may contact their local laboratory for further information.   SERIAL TROPONIN-INITIAL - Normal    Troponin I, High Sensitivity (CMC) 6      Narrative:     Less than 99th percentile of normal range cutoff-  Female and children under 18 years old <35 ng/L; Male <54 ng/L: Negative  Repeat testing should be performed if clinically indicated.     Female and children under 18 years old  ng/L; Male  ng/L:  Consistent with possible cardiac damage and possible increased clinical   risk. Serial measurements may help to assess extent of myocardial damage.     >120 ng/L: Consistent with cardiac damage, increased clinical risk and  myocardial infarction. Serial measurements may help assess extent of   myocardial damage.      NOTE: Children less than 1 year old may have higher baseline troponin   levels and results should be interpreted in conjunction with the overall   clinical context.    NOTE: Troponin I testing is performed using a different   testing methodology at [facility] than at other   [facility] hospitals. Direct result comparisons should only   be made within the same method.     TROPONIN SERIES- (INITIAL, 1 HR)    Narrative:     The following orders were created for panel order Troponin I  Series, High Sensitivity (0, 1 HR).  Procedure                               Abnormality         Status                     ---------                               -----------         ------                     Troponin I, High Sensiti...[[id number]]  Normal              Final result               Troponin, High Sensitivi...[[id number]]                      In process                   Please view results for these tests on the individual orders.   SERIAL TROPONIN, 1 HOUR         Past History   Medical History[1]  Surgical History[2]  Social History[3]      Medications/Allergies     Previous Medications    AMLODIPINE (NORVASC) 5 MG TABLET    Take 1 tablet (5 mg) by mouth once daily.    ATORVASTATIN (LIPITOR) 80 MG TABLET    Take 1 tablet (80 mg) by mouth once daily.    ATORVASTATIN (LIPITOR) 80 MG TABLET    Take 1 tablet (80 mg) by mouth once daily for 90 doses.    BLOOD PRESSURE MONITOR (BLOOD PRESSURE KIT) KIT    1 kit 3 (three) times a week.    CARVEDILOL (COREG) 12.5 MG TABLET    Take 1 tablet (12.5 mg) by mouth 2 times a day.    LOSARTAN (COZAAR) 25 MG TABLET    Take 1 tablet (25 mg) by mouth once daily.    NITROGLYCERIN (NITROSTAT) 0.4 MG SL TABLET    Place 1 tablet (0.4 mg) under the tongue every 5 minutes if needed for chest pain.     Allergies[4]      Review of Systems  All systems reviewed and otherwise negative, except as stated above in HPI.      Physical Exam on Admission     Visit Vitals  /87   Pulse 57   Temp 36.5 °C (97.7 °F)   Resp 16   Ht 1.829 m (6')   Wt 104 kg (229 lb 4.5 oz)   SpO2 98%   BMI 31.10 kg/m²   Smoking Status Never   BSA 2.3 m²       GENERAL: The patient appears nourished and normally developed. Vital signs as documented.     HEAD: Head normocephalic and atraumatic     NECK: Supple with full nonpainful ROM. No JVD    EYES: EOMs intact. PERRLA. Sclera non injected and without discharge    ENT: Mucous membranes moist. Nares patent without copious rhinorrhea. Posterior oropharynx is  clear. Hearing is grossly intact. External ear is normal.     PULMONARY: Lungs are clear to auscultation bilaterally. Good air movement. Able to speak full sentences, no tachypnea or dyspnea. No accessory muscle use or retractions. Without any evidence of respiratory distress.     CARDIAC: Normal rate. No murmurs, rubs or gallops.  No chest wall tenderness    ABDOMEN: Soft, non-distended, non-tender, BS positive x 4 quadrants, No rebound or guarding, no peritoneal signs    MUSCULOSKELETAL: Able to ambulate, Non edematous, with no obvious deformities. Pulses intact distal    SKIN: Good color, with no significant rashes.  No pallor, cyanosis or jaundice.    NEURO: No obvious neurological deficits. Able to follow commands. Normal sensation and strength bilaterally.     Psych: Appropriate mood and affect    Consultants  1) None      Impression and Plan  In summary, Derik Malin is admitted to the [facility] Clinical Decision Unit for Chest pain.     Dr. Salguero is the CDU admission attending.    This patient has been risk-stratified based on available history, physical exam, and related study findings. Admission to the observation status for further diagnosis/treatment/monitoring of Chest pain is warranted clinically. This extended period of observation is specifically required to determine the need for hospitalization.     The goals of this admission based on the patient’s clinical problem list are:  1) Chest Pain       --Monitor VS       --Telemetry monitoring       --Continuous pulse ox       --Cardiac diet with plan for NPO at midnight       --Coronary CTA in the AM      Will monitor patient for the following endpoints       --Stable VS       --No new arrhythmias seen on telemetry       --Symptom improvement       --Benign cardiac workup        When met, appropriate disposition will be arranged.    Swetha Rivera PA-C  Emergency Medicine/Clinical Decision Unit  [facility]  [facility]          [1] History reviewed. No pertinent past medical history.  [2]   Past Surgical History:  Procedure Laterality Date    CT ABDOMEN PELVIS ANGIOGRAM W AND/OR WO IV CONTRAST  11/25/2013    CT ABDOMEN PELVIS ANGIOGRAM W AND/OR WO IV CONTRAST 11/25/2013 [facility] [place] [facility]    CT ANGIO CORONARY ART WITH HEARTFLOW IF SCORE >30%  1/10/2023    CT HEART CORONARY ANGIOGRAM 1/10/2023 DOCTOR OFFICE LEGACY    HERNIA REPAIR  02/24/2015    Hernia Repair Inguinal Sliding    MR HEAD ANGIO WO IV CONTRAST  2/28/2016    MR HEAD ANGIO WO IV CONTRAST 2/28/2016 [facility] CLINICAL LEGACY    MR NECK ANGIO WO IV CONTRAST  2/28/2016    MR NECK ANGIO WO IV CONTRAST 2/28/2016 [facility] CLINICAL LEGACY   [3]   Social History  Socioeconomic History    Marital status:    Tobacco Use    Smoking status: Never    Smokeless tobacco: Never   Substance and Sexual Activity    Alcohol use: Never    Drug use: Never     Social Drivers of Health     Financial Resource Strain: Low Risk  (1/22/2025)    Overall Financial Resource Strain (CARDIA)     Difficulty of Paying Living Expenses: Not very hard   Food Insecurity: No Food Insecurity (1/21/2025)    Hunger Vital Sign     Worried About Running Out of Food in the Last Year: Never true     Ran Out of Food in the Last Year: Never true   Transportation Needs: No Transportation Needs (1/22/2025)    PRAPARE - Transportation     Lack of Transportation (Medical): No     Lack of Transportation (Non-Medical): No   Intimate Partner Violence: Not At Risk (1/21/2025)    Humiliation, Afraid, Rape, and Kick questionnaire     Fear of Current or Ex-Partner: No     Emotionally Abused: No     Physically Abused: No     Sexually Abused: No   Housing Stability: Low Risk  (1/22/2025)    Housing Stability Vital Sign     Unable to Pay for Housing in the Last Year: No     Number of Times Moved in the Last Year: 1     Homeless in the Last Year: No   [4] No Known Allergies

## 2025-04-23 ENCOUNTER — APPOINTMENT (OUTPATIENT)
Dept: RADIOLOGY | Facility: HOSPITAL | Age: 60
DRG: 303 | End: 2025-04-23
Payer: MEDICARE

## 2025-04-23 PROBLEM — R07.9 CHEST PAIN, UNSPECIFIED TYPE: Status: ACTIVE | Noted: 2025-04-23

## 2025-04-23 PROBLEM — I24.0: Status: ACTIVE | Noted: 2025-04-23

## 2025-04-23 PROBLEM — I24.0 RCA OCCLUSION (MULTI): Status: ACTIVE | Noted: 2025-04-23

## 2025-04-23 LAB
ALBUMIN SERPL BCP-MCNC: 4.3 G/DL (ref 3.4–5)
ANION GAP SERPL CALC-SCNC: 15 MMOL/L (ref 10–20)
APTT PPP: 25 SECONDS (ref 26–36)
ATRIAL RATE: 77 BPM
BUN SERPL-MCNC: 17 MG/DL (ref 6–23)
CALCIUM SERPL-MCNC: 9.9 MG/DL (ref 8.6–10.6)
CHLORIDE SERPL-SCNC: 107 MMOL/L (ref 98–107)
CO2 SERPL-SCNC: 20 MMOL/L (ref 21–32)
CREAT SERPL-MCNC: 0.92 MG/DL (ref 0.5–1.3)
EGFRCR SERPLBLD CKD-EPI 2021: >90 ML/MIN/1.73M*2
ERYTHROCYTE [DISTWIDTH] IN BLOOD BY AUTOMATED COUNT: 12.9 % (ref 11.5–14.5)
GLUCOSE SERPL-MCNC: 158 MG/DL (ref 74–99)
HCT VFR BLD AUTO: 41.7 % (ref 41–52)
HGB BLD-MCNC: 14.1 G/DL (ref 13.5–17.5)
INR PPP: 1.3 (ref 0.9–1.1)
MAGNESIUM SERPL-MCNC: 2.36 MG/DL (ref 1.6–2.4)
MCH RBC QN AUTO: 28.8 PG (ref 26–34)
MCHC RBC AUTO-ENTMCNC: 33.8 G/DL (ref 32–36)
MCV RBC AUTO: 85 FL (ref 80–100)
NRBC BLD-RTO: 0 /100 WBCS (ref 0–0)
P AXIS: 48 DEGREES
P OFFSET: 208 MS
P ONSET: 157 MS
PHOSPHATE SERPL-MCNC: 4 MG/DL (ref 2.5–4.9)
PLATELET # BLD AUTO: 264 X10*3/UL (ref 150–450)
POTASSIUM SERPL-SCNC: 3.9 MMOL/L (ref 3.5–5.3)
PR INTERVAL: 140 MS
PROTHROMBIN TIME: 14 SECONDS (ref 9.8–12.4)
Q ONSET: 227 MS
QRS COUNT: 12 BEATS
QRS DURATION: 124 MS
QT INTERVAL: 414 MS
QTC CALCULATION(BAZETT): 468 MS
QTC FREDERICIA: 449 MS
R AXIS: 45 DEGREES
RBC # BLD AUTO: 4.89 X10*6/UL (ref 4.5–5.9)
SODIUM SERPL-SCNC: 138 MMOL/L (ref 136–145)
T AXIS: 51 DEGREES
T OFFSET: 434 MS
VENTRICULAR RATE: 77 BPM
WBC # BLD AUTO: 4.6 X10*3/UL (ref 4.4–11.3)

## 2025-04-23 PROCEDURE — 2500000001 HC RX 250 WO HCPCS SELF ADMINISTERED DRUGS (ALT 637 FOR MEDICARE OP): Performed by: NURSE PRACTITIONER

## 2025-04-23 PROCEDURE — 2500000004 HC RX 250 GENERAL PHARMACY W/ HCPCS (ALT 636 FOR OP/ED): Mod: JZ

## 2025-04-23 PROCEDURE — 99223 1ST HOSP IP/OBS HIGH 75: CPT | Performed by: STUDENT IN AN ORGANIZED HEALTH CARE EDUCATION/TRAINING PROGRAM

## 2025-04-23 PROCEDURE — 75574 CT ANGIO HRT W/3D IMAGE: CPT

## 2025-04-23 PROCEDURE — 75574 CT ANGIO HRT W/3D IMAGE: CPT | Performed by: INTERNAL MEDICINE

## 2025-04-23 PROCEDURE — 96375 TX/PRO/DX INJ NEW DRUG ADDON: CPT

## 2025-04-23 PROCEDURE — 2500000001 HC RX 250 WO HCPCS SELF ADMINISTERED DRUGS (ALT 637 FOR MEDICARE OP)

## 2025-04-23 PROCEDURE — 36415 COLL VENOUS BLD VENIPUNCTURE: CPT | Performed by: NURSE PRACTITIONER

## 2025-04-23 PROCEDURE — 2550000001 HC RX 255 CONTRASTS: Mod: JZ

## 2025-04-23 PROCEDURE — 99233 SBSQ HOSP IP/OBS HIGH 50: CPT | Performed by: PHYSICIAN ASSISTANT

## 2025-04-23 PROCEDURE — 2500000001 HC RX 250 WO HCPCS SELF ADMINISTERED DRUGS (ALT 637 FOR MEDICARE OP): Performed by: EMERGENCY MEDICINE

## 2025-04-23 PROCEDURE — 85610 PROTHROMBIN TIME: CPT | Performed by: NURSE PRACTITIONER

## 2025-04-23 PROCEDURE — 85027 COMPLETE CBC AUTOMATED: CPT | Performed by: NURSE PRACTITIONER

## 2025-04-23 PROCEDURE — 80069 RENAL FUNCTION PANEL: CPT | Performed by: NURSE PRACTITIONER

## 2025-04-23 PROCEDURE — 83735 ASSAY OF MAGNESIUM: CPT | Performed by: NURSE PRACTITIONER

## 2025-04-23 PROCEDURE — 2500000004 HC RX 250 GENERAL PHARMACY W/ HCPCS (ALT 636 FOR OP/ED): Mod: JZ | Performed by: NURSE PRACTITIONER

## 2025-04-23 PROCEDURE — 1200000002 HC GENERAL ROOM WITH TELEMETRY DAILY

## 2025-04-23 RX ORDER — CALCIUM CARBONATE 200(500)MG
1000 TABLET,CHEWABLE ORAL 3 TIMES DAILY PRN
Status: DISCONTINUED | OUTPATIENT
Start: 2025-04-23 | End: 2025-04-26 | Stop reason: HOSPADM

## 2025-04-23 RX ORDER — PANTOPRAZOLE SODIUM 40 MG/1
40 TABLET, DELAYED RELEASE ORAL
Status: DISCONTINUED | OUTPATIENT
Start: 2025-04-24 | End: 2025-04-26 | Stop reason: HOSPADM

## 2025-04-23 RX ORDER — AMOXICILLIN 250 MG
2 CAPSULE ORAL 2 TIMES DAILY
Status: DISCONTINUED | OUTPATIENT
Start: 2025-04-23 | End: 2025-04-26 | Stop reason: HOSPADM

## 2025-04-23 RX ORDER — NAPROXEN SODIUM 220 MG/1
81 TABLET, FILM COATED ORAL DAILY
Status: DISCONTINUED | OUTPATIENT
Start: 2025-04-23 | End: 2025-04-26 | Stop reason: HOSPADM

## 2025-04-23 RX ORDER — AMLODIPINE BESYLATE 10 MG/1
10 TABLET ORAL DAILY
Status: DISCONTINUED | OUTPATIENT
Start: 2025-04-23 | End: 2025-04-26 | Stop reason: HOSPADM

## 2025-04-23 RX ORDER — ENOXAPARIN SODIUM 100 MG/ML
40 INJECTION SUBCUTANEOUS EVERY 24 HOURS
Status: DISCONTINUED | OUTPATIENT
Start: 2025-04-23 | End: 2025-04-26 | Stop reason: HOSPADM

## 2025-04-23 RX ORDER — LOSARTAN POTASSIUM 25 MG/1
25 TABLET ORAL ONCE
Status: COMPLETED | OUTPATIENT
Start: 2025-04-23 | End: 2025-04-23

## 2025-04-23 RX ORDER — NITROGLYCERIN 0.4 MG/1
0.4 TABLET SUBLINGUAL EVERY 5 MIN PRN
Status: DISCONTINUED | OUTPATIENT
Start: 2025-04-23 | End: 2025-04-26 | Stop reason: HOSPADM

## 2025-04-23 RX ORDER — ACETAMINOPHEN 325 MG/1
650 TABLET ORAL EVERY 4 HOURS PRN
Status: DISCONTINUED | OUTPATIENT
Start: 2025-04-23 | End: 2025-04-26 | Stop reason: HOSPADM

## 2025-04-23 RX ADMIN — LOSARTAN POTASSIUM 25 MG: 25 TABLET, FILM COATED ORAL at 09:05

## 2025-04-23 RX ADMIN — ENOXAPARIN SODIUM 40 MG: 100 INJECTION SUBCUTANEOUS at 20:16

## 2025-04-23 RX ADMIN — IOHEXOL 90 ML: 350 INJECTION, SOLUTION INTRAVENOUS at 08:42

## 2025-04-23 RX ADMIN — AMLODIPINE BESYLATE 10 MG: 10 TABLET ORAL at 20:14

## 2025-04-23 RX ADMIN — METOPROLOL TARTRATE 5 MG: 5 INJECTION, SOLUTION INTRAVENOUS at 08:25

## 2025-04-23 RX ADMIN — SENNOSIDES AND DOCUSATE SODIUM 2 TABLET: 50; 8.6 TABLET ORAL at 20:14

## 2025-04-23 RX ADMIN — ATORVASTATIN CALCIUM 80 MG: 80 TABLET, FILM COATED ORAL at 09:07

## 2025-04-23 RX ADMIN — CARVEDILOL 12.5 MG: 12.5 TABLET, FILM COATED ORAL at 20:14

## 2025-04-23 RX ADMIN — METOPROLOL TARTRATE 5 MG: 5 INJECTION, SOLUTION INTRAVENOUS at 08:30

## 2025-04-23 RX ADMIN — CARVEDILOL 12.5 MG: 12.5 TABLET, FILM COATED ORAL at 09:05

## 2025-04-23 RX ADMIN — NITROGLYCERIN 0.8 MG: 0.4 TABLET SUBLINGUAL at 08:31

## 2025-04-23 RX ADMIN — LOSARTAN POTASSIUM 25 MG: 25 TABLET, FILM COATED ORAL at 20:14

## 2025-04-23 RX ADMIN — ASPIRIN 81 MG: 81 TABLET, CHEWABLE ORAL at 20:14

## 2025-04-23 ASSESSMENT — ENCOUNTER SYMPTOMS
DIAPHORESIS: 0
DYSPNEA ON EXERTION: 1
COUGH: 0
FEVER: 0
SORE THROAT: 0
HEMATURIA: 0
HEMATEMESIS: 0
FALLS: 0
ORTHOPNEA: 0
LIGHT-HEADEDNESS: 0
NAUSEA: 0
POOR WOUND HEALING: 0
SHORTNESS OF BREATH: 0
DOUBLE VISION: 0
HEMOPTYSIS: 0
BRUISES/BLEEDS EASILY: 0
HOARSE VOICE: 0
SUSPICIOUS LESIONS: 0
ALTERED MENTAL STATUS: 0
DYSURIA: 0
PALPITATIONS: 0
WEAKNESS: 0
NEAR-SYNCOPE: 0
COLOR CHANGE: 0
HEMATOCHEZIA: 0
DIARRHEA: 0
BLOATING: 1
HEADACHES: 0
CHILLS: 0
VOMITING: 0
HEARTBURN: 1
FLANK PAIN: 0
SYNCOPE: 0
CONSTIPATION: 0

## 2025-04-23 NOTE — HOSPITAL COURSE
Derik Malin is a 60 y.o. male with PMH significant for GERD, HTN, HLD, CAD s/p CABG x2 (LIMA-LAD, SVG-OM) in 2/2024 @[facility] c/b post-op pAfib s/p medical conversion w/amio & BB (no longer on AAD, never on AC), presented to ED w/unstable angina; CTA cors (4/23) revealed: total Ca score of 462, patent LIMA-LAD and SVG-OM grafts, prox-distal LM 60%, diffuse prox-mid LAD 50-69%, Lcx 50%, OM1 50%, subtotally occluded mid RCA and prox ramus intermedius.     ED:  Given ASA in ED, ECG negative for acute ischemic changes, negative HS trops w/mildly elevated LFTs, CXR with clear lungs, POCUS without significant pericardial effusion and/or reduced LV/RV function, morphine & lidocaine patch provided some relief. Underwent coronary CTA (results as above), admitted for unstable angina to pursue [facility].    Floor Course:  Continued to experience brief episodes of unstable angina which self-resolved. Discussed importance of med compliance (reports running out of home coreg/losartan for the past month), to which he verbalized understanding. Started baby ASA, and zetia for . Zetia unaffordable at time of discharge ($70/month), therefore discontinued. Can be resumed as an outpatient pending cost.     Patient remained CP free >36 hours with initiation of anti- HTN regimen. BP regimen: amlodipine 10 mg daily, coreg 12.5 mg daily, and losartan 50 mg daily. PRN SL nitro continued upon discharge (has this med available at home).     On date of discharge, I primary team discussed resolution of chest pain, lack of troponin elevation, and importance of HTN management and medication compliance. Determined due to lack of ongoing symptoms, will defer invasive ischemic eval at this time and plan for close outpatient cardiology follow- up. New start PCP appointment scheduled on 4/28, referral placed for cardiology follow- up with Dr. Iniguez.     Patient would benefit from a clinical pharmacy referral outpatient to aid with medication  affordability.     Of note, complained of abdominal fullness/bloating, relieved by vomiting (happens at home as well). Reports regular BMs. Started PPI. KUB showed moderate amount of stool in non- distended colon, with successful BMs after initiation of bowel regimen. GI referral placed for potential gastric emptying study.    Discharge weight: 105 kg    After all labs and VS were reviewed the decision was made that the patient was medically stable for discharge.  The patient was discharged in satisfactory condition.    More than 60 minutes were spent in coordinating patient discharge.

## 2025-04-23 NOTE — PROGRESS NOTES
Daily Progress Note  [facility] CLINICAL DECISION    Subjective  Derik Malin has been admitted to the CDU for 3 hours. Serial assessments of the patient's clinical progress include:  Patient states he still has chest pain however the chest pain is the same as it was when he first arrived to the ED.  Patient denies shortness of breath, abdominal pain, nausea, vomiting.  No fevers noted.      Objective  PHYSICAL EXAM:   Vital signs reviewed and noted no distress. Afebrile.  General: Patient is conversant, well-appearing and well-nourished. NAD.  Head/Neck: Normocephalic, Atraumatic. Neck is supple, full ROM without lymphadenopathy.   Eyes: PERRL, EOMI without scleral icterus. Conjunctiva clear.  ENMT: Hearing grossly intact. MMM. Posterior oropharynx unremarkable. Normal phonation. No stridor, drooling or trismus.  Cardiac: Regular rate & rhythm. No murmur, gallops, rubs or extrasystole.  Pulmonary: CTAB with normal effort and good aeration throughout. No accessory muscle usage. No adventitious breath sounds.   Abdomen: Soft, non-tender, nonsurgical. No masses. No rebound, rigidity or guarding. Normoactive BS x 4 quadrants.  : Deferred.  MSK: Full Spontaneously ROM intact. Symmetric muscle bulk without step-offs or gross deformity.  Vascular: Distal pulses full and equal. No cyanosis, clubbing or pitting LE edema. Capillary refill < 2s  Skin: WWP. Intact without rashes, lesions or discoloration. Turgor is good.  Neuro: CN II-XII intact. Awake, A&Ox3. Speech is fluent. No focal deficits noted.  Psychiatric: Mood and affect appropriate for situation.    Diagnostic Evaluation:     Labs reviewed  Results for orders placed or performed during the hospital encounter of 04/22/25 (from the past 24 hours)   CBC and Auto Differential   Result Value Ref Range    WBC 4.2 (L) 4.4 - 11.3 x10*3/uL    nRBC 0.0 0.0 - 0.0 /100 WBCs    RBC 4.75 4.50 - 5.90 x10*6/uL    Hemoglobin 13.6 13.5 - 17.5 g/dL    Hematocrit  40.7 (L) 41.0 - 52.0 %    MCV 86 80 - 100 fL    MCH 28.6 26.0 - 34.0 pg    MCHC 33.4 32.0 - 36.0 g/dL    RDW 12.9 11.5 - 14.5 %    Platelets 236 150 - 450 x10*3/uL    Neutrophils % 40.4 40.0 - 80.0 %    Immature Granulocytes %, Automated 0.2 0.0 - 0.9 %    Lymphocytes % 43.1 13.0 - 44.0 %    Monocytes % 12.0 2.0 - 10.0 %    Eosinophils % 3.6 0.0 - 6.0 %    Basophils % 0.7 0.0 - 2.0 %    Neutrophils Absolute 1.67 1.20 - 7.70 x10*3/uL    Immature Granulocytes Absolute, Automated 0.01 0.00 - 0.70 x10*3/uL    Lymphocytes Absolute 1.79 1.20 - 4.80 x10*3/uL    Monocytes Absolute 0.50 0.10 - 1.00 x10*3/uL    Eosinophils Absolute 0.15 0.00 - 0.70 x10*3/uL    Basophils Absolute 0.03 0.00 - 0.10 x10*3/uL   Comprehensive metabolic panel   Result Value Ref Range    Glucose 85 74 - 99 mg/dL    Sodium 139 136 - 145 mmol/L    Potassium 3.7 3.5 - 5.3 mmol/L    Chloride 106 98 - 107 mmol/L    Bicarbonate 25 21 - 32 mmol/L    Anion Gap 12 10 - 20 mmol/L    Urea Nitrogen 13 6 - 23 mg/dL    Creatinine 0.88 0.50 - 1.30 mg/dL    eGFR >90 >60 mL/min/1.73m*2    Calcium 9.4 8.6 - 10.6 mg/dL    Albumin 4.4 3.4 - 5.0 g/dL    Alkaline Phosphatase 66 33 - 136 U/L    Total Protein 7.9 6.4 - 8.2 g/dL    AST 42 (H) 9 - 39 U/L    Bilirubin, Total 1.0 0.0 - 1.2 mg/dL    ALT 66 (H) 10 - 52 U/L   B-Type Natriuretic Peptide   Result Value Ref Range    BNP 9 0 - 99 pg/mL   Troponin I, High Sensitivity, Initial   Result Value Ref Range    Troponin I, High Sensitivity (CMC) 6 0 - 53 ng/L           Imaging   Imaging  XR chest 2 views  Result Date: 4/22/2025  No evidence of consolidating infiltrate or effusion. Signed by Milton Hernandez MD      Cardiology, Vascular, and Other Imaging  Point of Care Ultrasound  Result Date: 4/22/2025  Virgen Hirsch MD     4/22/2025  3:45 PM Performed by: Virgen Hirsch MD Authorized by: Shreyas Salguero MD  Cardiac Indications: chest pain Procedure: Cardiac Ultrasound Findings:  Views: parasternal long, parasternal short and apical  four The pericardial space was visualized and was NEGATIVE for a significant pericardial effusion. LV: LV systolic function was NORMAL. RV: RV size was NORMAL. Impression: Cardiac: The focused cardiac ultrasound exam was NORMAL.          Medications:     Scheduled medications      - Scheduled Medications[1]     Continuous medications      - Continuous Medications[2]     PRN medications      - PRN Medications[3]     Assessment & Plan  Derik Malin continues to be managed in accordance with the CDU clinical guidelines for chest pain. An update of their clinical problem list included:     1) chest pain    -- Coronary CTA in the a.m.    --N.p.o. at midnight for coronary CTA    --Continuous cardiac telemetry and pulse oximetry    Rip Bowden PA-C  [facility]          [1] atorvastatin, 80 mg, oral, Daily  [Transfer Hold] carvedilol, 12.5 mg, oral, BID  [Transfer Hold] lidocaine, 1 patch, transdermal, Daily  losartan, 25 mg, oral, Daily  [Transfer Hold] metoprolol, 5 mg, intravenous, Once  [Transfer Hold] metoprolol tartrate, 100 mg, oral, Once  [Transfer Hold] nitroglycerin, 0.8 mg, sublingual, Once  [2]    [3] PRN medications: [Transfer Hold] LORazepam, [Transfer Hold] metoprolol, [Transfer Hold] metoprolol, [Transfer Hold] metoprolol, [Transfer Hold] metoprolol, [Transfer Hold] metoprolol tartrate

## 2025-04-23 NOTE — PROGRESS NOTES
Disposition Note  [facility] CLINICAL DECISION  Patient: Derik Malin  MRN: [id number]  : 1965  Date of Evaluation: 2025  ED Provider: Jonah Holley PA-C      Limitations to history: None  Independent Historian: Yes  External Records Reviewed: Recent and relevant inpatient and outpatient notes in EMR      Subjective:    Derik Malin is a 60 y.o. male has undergone comprehensive diagnostic evaluation and therapeutic management in accordance with the CDU guidelines for chest pain. Based on Mr. Malin's clinical response and diagnostic information during this period of observation, it has been determined that the patient will be admitted to the hospital.    The acute evaluation included:  Orders Placed This Encounter   Procedures    XR chest 2 views    Point of Care Ultrasound    CT angio coronary art with heartflow if score >30%    CBC and Auto Differential    Comprehensive metabolic panel    Troponin I Series, High Sensitivity (0, 1 HR)    B-Type Natriuretic Peptide    Troponin I, High Sensitivity, Initial    Troponin I, High Sensitivity    Adult diet 2-3 grams sodium    Nursing communication for Metoprolol/Labetalol dosing    ECG 12 lead    Initiate Observation Send to CDU    Admit to inpatient         Placed in observation at: 1808       Past History   Medical History[1]  Surgical History[2]  Social History[3]      Medications/Allergies     Previous Medications    AMLODIPINE (NORVASC) 5 MG TABLET    Take 1 tablet (5 mg) by mouth once daily.    ATORVASTATIN (LIPITOR) 80 MG TABLET    Take 1 tablet (80 mg) by mouth once daily.    ATORVASTATIN (LIPITOR) 80 MG TABLET    Take 1 tablet (80 mg) by mouth once daily for 90 doses.    BLOOD PRESSURE MONITOR (BLOOD PRESSURE KIT) KIT    1 kit 3 (three) times a week.    CARVEDILOL (COREG) 12.5 MG TABLET    Take 1 tablet (12.5 mg) by mouth 2 times a day.    LOSARTAN (COZAAR) 25 MG TABLET    Take 1 tablet (25 mg) by mouth once daily.    NITROGLYCERIN  (NITROSTAT) 0.4 MG SL TABLET    Place 1 tablet (0.4 mg) under the tongue every 5 minutes if needed for chest pain.     Allergies[4]      Review of Systems  All systems reviewed and otherwise negative, except as stated above in HPI.    Diagnostics reviewed by Jonah Holley PA-C     Labs:  Results for orders placed or performed during the hospital encounter of 04/22/25   ECG 12 lead    Collection Time: 04/22/25  3:06 PM   Result Value Ref Range    Ventricular Rate 77 BPM    Atrial Rate 77 BPM    ND Interval 140 ms    QRS Duration 124 ms    QT Interval 414 ms    QTC Calculation(Bazett) 468 ms    P Axis 48 degrees    R Axis 45 degrees    T Axis 51 degrees    QRS Count 12 beats    Q Onset 227 ms    P Onset 157 ms    P Offset 208 ms    T Offset 434 ms    QTC Fredericia 449 ms   CBC and Auto Differential    Collection Time: 04/22/25  3:13 PM   Result Value Ref Range    WBC 4.2 (L) 4.4 - 11.3 x10*3/uL    nRBC 0.0 0.0 - 0.0 /100 WBCs    RBC 4.75 4.50 - 5.90 x10*6/uL    Hemoglobin 13.6 13.5 - 17.5 g/dL    Hematocrit 40.7 (L) 41.0 - 52.0 %    MCV 86 80 - 100 fL    MCH 28.6 26.0 - 34.0 pg    MCHC 33.4 32.0 - 36.0 g/dL    RDW 12.9 11.5 - 14.5 %    Platelets 236 150 - 450 x10*3/uL    Neutrophils % 40.4 40.0 - 80.0 %    Immature Granulocytes %, Automated 0.2 0.0 - 0.9 %    Lymphocytes % 43.1 13.0 - 44.0 %    Monocytes % 12.0 2.0 - 10.0 %    Eosinophils % 3.6 0.0 - 6.0 %    Basophils % 0.7 0.0 - 2.0 %    Neutrophils Absolute 1.67 1.20 - 7.70 x10*3/uL    Immature Granulocytes Absolute, Automated 0.01 0.00 - 0.70 x10*3/uL    Lymphocytes Absolute 1.79 1.20 - 4.80 x10*3/uL    Monocytes Absolute 0.50 0.10 - 1.00 x10*3/uL    Eosinophils Absolute 0.15 0.00 - 0.70 x10*3/uL    Basophils Absolute 0.03 0.00 - 0.10 x10*3/uL   Comprehensive metabolic panel    Collection Time: 04/22/25  3:13 PM   Result Value Ref Range    Glucose 85 74 - 99 mg/dL    Sodium 139 136 - 145 mmol/L    Potassium 3.7 3.5 - 5.3 mmol/L    Chloride 106 98 - 107 mmol/L     Bicarbonate 25 21 - 32 mmol/L    Anion Gap 12 10 - 20 mmol/L    Urea Nitrogen 13 6 - 23 mg/dL    Creatinine 0.88 0.50 - 1.30 mg/dL    eGFR >90 >60 mL/min/1.73m*2    Calcium 9.4 8.6 - 10.6 mg/dL    Albumin 4.4 3.4 - 5.0 g/dL    Alkaline Phosphatase 66 33 - 136 U/L    Total Protein 7.9 6.4 - 8.2 g/dL    AST 42 (H) 9 - 39 U/L    Bilirubin, Total 1.0 0.0 - 1.2 mg/dL    ALT 66 (H) 10 - 52 U/L   B-Type Natriuretic Peptide    Collection Time: 04/22/25  3:13 PM   Result Value Ref Range    BNP 9 0 - 99 pg/mL   Troponin I, High Sensitivity, Initial    Collection Time: 04/22/25  3:13 PM   Result Value Ref Range    Troponin I, High Sensitivity (CMC) 6 0 - 53 ng/L   Troponin I, High Sensitivity    Collection Time: 04/22/25  8:13 PM   Result Value Ref Range    Troponin I, High Sensitivity (CMC) 6 0 - 53 ng/L     Radiographs:  CT angio coronary art with heartflow if score >30%   Final Result   1. Coronary artery calcium score of 462*.   2. Right dominant system.   3. Overall severe native multi-vessel obstructive coronary artery   disease with patent CABG grafts and a potential subtotally occluded,   unbypassed RCA.   4. Severe proximal, subtotally occluded ramus intermedius.   5. Severe native LM (up to 60%) and LAD (sequential lesions with up   to 70% stenosis) disease with patent LIMA-LAD graft.   6. Moderate LCX disease with patent SVG-OM2 graft.   7. Severe proximal RCA (>70% stenosis) with potential subtotal   occlusion in the mid segment. Distal RCA and RPDA have relatively   weak contrast opacification and may partially backfill via LCX   collaterals.   8. If within goals of care, consider invasive coronary angiogram to   assess for possible RCA and/or ramus intermedius intervention versus   maximized medical management.        *Coronary Artery  Agatston score        Jaime sandhu al. JCCT 2016 (http://dx.doi.org/10.1016/j.jcct.[email or web address])        JONES 10-Year CHD Risk with Coronary Artery Calcification can be   calculated  using link below   [email or web address]   Arlene. JACC 2015 (http://dx.doi.org/10.1016/j.j   acc.[email or web address])        Signed by: Lucas Verónica 4/23/2025 11:08 AM   Dictation workstation:   [id number]      XR chest 2 views   Final Result   No evidence of consolidating infiltrate or effusion.   Signed by Milton Hernandez MD      Point of Care Ultrasound                 Physical Exam     Visit Vitals  /89   Pulse 67   Temp 36.7 °C (98 °F) (Temporal)   Resp 16   Ht 1.829 m (6')   Wt 104 kg (229 lb 4.5 oz)   SpO2 (!) 93%   BMI 31.10 kg/m²   Smoking Status Never   BSA 2.3 m²       Physical exam  VS: As documented in the triage note and EMR flowsheet from this visit were reviewed.    General: Patient is AAOx3, appears well developed, well nourished, is a good historian, answers questions appropriately    HEENT: head normocephalic, atraumatic, EOMs intact, oropharynx without erythema or exudate, buccal mucosa intact without lesions, nose is patent bilateral    Neck: supple, full ROM, negative for lymphadenopathy, JVD, thyromegaly, tracheal deviation, nuchal rigidity    Pulmonary: Clear to auscultation bilaterally, No wheezing, rales, or rhonchi, no accessory muscle use, able to speak full clear sentences    Cardiac: Normal rate and rhythm, no murmurs, rubs or gallops    GI: soft, non-tender, non-distended, normoactive bowelsounds in all four quadrants, no masses or organomegaly, no guarding or CVA tenderness noted    Musculoskeletal: full weight bearing, MASY, no joint effusions, clubbing or edema noted    Skin: Warm, dry, intact, no lesions or rashes noted, turgor is good.    Neuro: patient follow commands, cranial nerves 2-12 grossly intact, motor strengths 5/5 upper and lower extremities, sensation are symmetrical. No focal deficits.    Psych: Appropriate mood and affect for situation      Consultants  1) N/A      Impression and Plan    In summary, Derik  Kimo has been cared for according to the standard [facility] Clinical Decision Unit observation protocol for Chest pain. This extended period of observation was specifically required to determine the need for hospitalization. Prior to discharge from observation, the final physical exam is documented above.     Significant events during the course of observation based on the goals of the clinical problem list include:   1) remained stable  2) chest pain-free    Based on the patient's condition and test results, the patient will be admitted to the hospital    Total length of observation was 21 hours. Dr. Salguero is the CDU disposition attending.      Discharge Diagnosis  Chest pain    Issues Requiring Follow-Up  See below    Discharge Meds     Your medication list        CONTINUE taking these medications        Instructions Last Dose Given Next Dose Due   blood pressure monitor kit  Commonly known as: Blood Pressure Kit      1 kit 3 (three) times a week.              ASK your doctor about these medications        Instructions Last Dose Given Next Dose Due   amLODIPine 5 mg tablet  Commonly known as: Norvasc      Take 1 tablet (5 mg) by mouth once daily.       atorvastatin 80 mg tablet  Commonly known as: Lipitor      Take 1 tablet (80 mg) by mouth once daily.       atorvastatin 80 mg tablet  Commonly known as: Lipitor      Take 1 tablet (80 mg) by mouth once daily for 90 doses.       carvedilol 12.5 mg tablet  Commonly known as: Coreg      Take 1 tablet (12.5 mg) by mouth 2 times a day.       losartan 25 mg tablet  Commonly known as: Cozaar      Take 1 tablet (25 mg) by mouth once daily.       nitroglycerin 0.4 mg SL tablet  Commonly known as: Nitrostat      Place 1 tablet (0.4 mg) under the tongue every 5 minutes if needed for chest pain.                Test Results Pending At Discharge  Pending Labs       No current pending labs.            Hospital Course   Mr. Malin was admitted to the  clinical decision unit for chest pain.  Medical workup included EKG, laboratory studies and imaging.  Diagnostic information was obtained, reviewed and discussed with the patient.  EKG showed normal sinus rhythm at a rate of 77 with right bundle branch block and no ST elevations or depressions no acute T wave abnormalities.  Unchanged from January 2025. CBC was unremarkable, no anemia or leukocytosis. CMP showed normal renal hepatic function without any electrolyte disturbances. BNP was normal. Troponin of 6 and 6. Chest x-ray showed no evidence of consolidating infiltrate or effusion.  Cardiac POCUS showed normal LV systolic function. ED team administered Toradol, morphine, aspirin and a lidocaine patch for pain.  Patient was placed into the CDU for additional restratification and provocative cardiac testing.  CTA coronary arteries were was performed this morning and had abnormal findings of unbypassed RCA that has a severe stenosis.  I spoke with cardiology consult team and recommendations were to admit to cardiology medicine team with potential for nonemergent cardiac catheterization.  Mr. More has remained stable and his chest pain-free and was informed of his clinical findings.  All questions were answered and the patient is in agreement with the medical plan of care.  He was admitted to the hospital in stable condition.    Assessment/plan    Chest pain  - Admission to the hospital    Outpatient Follow-Up  No future appointments.      Jonah Holley PA-C                [1] History reviewed. No pertinent past medical history.  [2]   Past Surgical History:  Procedure Laterality Date    CT ABDOMEN PELVIS ANGIOGRAM W AND/OR WO IV CONTRAST  11/25/2013    CT ABDOMEN PELVIS ANGIOGRAM W AND/OR WO IV CONTRAST [place] [facility] [place]    CT ANGIO CORONARY ART WITH HEARTFLOW IF SCORE >30%  1/10/2023    CT HEART CORONARY ANGIOGRAM 1/10/2023 DOCTOR OFFICE LEGACY    HERNIA REPAIR  02/24/2015    Hernia Repair  Inguinal Sliding    MR HEAD ANGIO WO IV CONTRAST  2/28/2016    MR HEAD ANGIO WO IV CONTRAST 2/28/2016 [facility] CLINICAL LEGACY    MR NECK ANGIO WO IV CONTRAST  2/28/2016    MR NECK ANGIO WO IV CONTRAST 2/28/2016 [facility] CLINICAL LEGACY   [3]   Social History  Socioeconomic History    Marital status:    Tobacco Use    Smoking status: Never    Smokeless tobacco: Never   Substance and Sexual Activity    Alcohol use: Never    Drug use: Never     Social Drivers of Health     Financial Resource Strain: Low Risk  (1/22/2025)    Overall Financial Resource Strain (CARDIA)     Difficulty of Paying Living Expenses: Not very hard   Food Insecurity: No Food Insecurity (1/21/2025)    Hunger Vital Sign     Worried About Running Out of Food in the Last Year: Never true     Ran Out of Food in the Last Year: Never true   Transportation Needs: No Transportation Needs (1/22/2025)    PRAPARE - Transportation     Lack of Transportation (Medical): No     Lack of Transportation (Non-Medical): No   Intimate Partner Violence: Not At Risk (1/21/2025)    Humiliation, Afraid, Rape, and Kick questionnaire     Fear of Current or Ex-Partner: No     Emotionally Abused: No     Physically Abused: No     Sexually Abused: No   Housing Stability: Low Risk  (1/22/2025)    Housing Stability Vital Sign     Unable to Pay for Housing in the Last Year: No     Number of Times Moved in the Last Year: 1     Homeless in the Last Year: No   [4] No Known Allergies

## 2025-04-23 NOTE — PROGRESS NOTES
Pharmacy Medication History Review    Derik Malin is a 60 y.o. male admitted for Chest pain. Pharmacy reviewed the patient's [id number] medications and allergies for accuracy.    Medications ADDED:  None  Medications CHANGED:  None  Medications REMOVED / NOT TAKING:   Carvedilol 12.5 mg tablet - patient not taking / last dose more than a month   Losartan 25 mg tablet - patient not taking / last dose more than a month    The list below reflects the updated PTA list.   Prior to Admission Medications   Prescriptions Last Dose Informant   amLODIPine (Norvasc) 5 mg tablet 2025 Morning Self   Sig: Take 1 tablet (5 mg) by mouth once daily.   atorvastatin (Lipitor) 80 mg tablet 2025 Morning Self   Sig: Take 1 tablet (80 mg) by mouth once daily.   atorvastatin (Lipitor) 80 mg tablet  Self   Sig: Take 1 tablet (80 mg) by mouth once daily for 90 doses.   blood pressure monitor (Blood Pressure Kit) kit  Self   Si kit 3 (three) times a week.   carvedilol (Coreg) 12.5 mg tablet More than a month    Sig: Take 1 tablet (12.5 mg) by mouth 2 times a day.   Patient not taking: Reported on 2025   losartan (Cozaar) 25 mg tablet More than a month    Sig: Take 1 tablet (25 mg) by mouth once daily.   Patient not taking: Reported on 2025   nitroglycerin (Nitrostat) 0.4 mg SL tablet 2025 Morning Self   Sig: Place 1 tablet (0.4 mg) under the tongue every 5 minutes if needed for chest pain.      Facility-Administered Medications: None        The list below reflects the updated allergy list. Please review each documented allergy for additional clarification and justification.  Allergies  Reviewed by Mayra Dey on 2025   No Known Allergies         Patient declines M2B at discharge.     Sources:   [facility] - no recent hx  Epic medication dispense hx report    chart review    admission med rec grid   Patient interview  2025 discharge summary with Shreyas Nuñez ,      Additional  "Comments:  Patient states he has been out of Losartan 25 mg tablets and Carvedilol 12.5 mg tablets for over a month . Patient reports he has not been able to get medications refilled and would like to request a refill to his local pharmacy during discharge.     No other concerns , pt is a reliable historian and appears adherent to all current home medications.       Mayra Dey  Pharmacy Technician  04/23/25     Secure Chat preferred   If no response call [id number] or [email or web address] \"Med Rec\"   "

## 2025-04-23 NOTE — H&P
History Of Present Illness:    Derik Malin is a 60 y.o. male with PMH significant for GERD, HTN, HLD, CAD s/p CABG x2 (LIMA-LAD, SVG-OM) in 2/2024 @[facility] c/b post-op pAfib s/p medical conversion w/amio & BB (no longer on AAD, never on AC), presented to ED w/unstable angina; CTA cors today revealed patent grafts w/subtotally occluded RCA & ramus intermedius. Plan for LHC & ?potential PCI tomorrow, being admitted under HHVI Service for further management.    Upon presentation to ED, patient reported experiencing non-radiating, sharp L-sided CP (with some reproducibility) at rest rated at 6/10 unrelieved by SL nitro x2. 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 Absence of associated s/sx (diaphoresis, syncope, SOB). Given ASA in ED, ECG negative for acute ischemic changes, negative HS trops w/mildly elevated LFTs, CXR with clear lungs, POCUS without significant pericardial effusion and/or reduced LV/RV function, morphine & lidocaine patch provided some relief.     Underwent Coronary CTA today, revealing the following: total Ca score of 462, patent LIMA-LAD and SVG-OM grafts, prox-distal LM 60%, diffuse prox-mid LAD 50-69%, Lcx 50%, OM1 50%, subtotally occluded mid RCA and prox ramus intermedius. Plan for LHC with ?potential PCI tomorrow. Upon interview, patient denies current chest discomfort/SOB, but endorses abdominal fullness/bloating which he attributes to his GERD.    Today's Plan:  - NPO at [facility] for LHC w/?potential PCI tomorrow  - adjust BP meds  - start daily baby ASA  - start PPI & monitor GERD s/sx     Last Recorded Vitals:  Vitals:    04/23/25 0831 04/23/25 0835 04/23/25 0836 04/23/25 1546   BP: 153/82 142/74 168/89    BP Location:       Patient Position:       Pulse: 61 65 67 68   Resp:    (!) 22   Temp:    36.6 °C (97.9 °F)   TempSrc:     Temporal   SpO2: 95%  (!) 93% (!) 93%   Weight:       Height:         Last Labs:  CBC - 4/22/2025:  3:13 PM  4.2 13.6 236    40.7      CMP - 4/22/2025:  3:13 PM  9.4 7.9 42 --- 1.0   3.8 4.4 66 66      PTT - No results in last year.  _   _ _     Troponin I, High Sensitivity (CMC)   Date/Time Value Ref Range Status   04/22/2025 08:13 PM 6 0 - 53 ng/L Final   04/22/2025 03:13 PM 6 0 - 53 ng/L Final   01/21/2025 11:06 AM 5 0 - 53 ng/L Final     BNP   Date/Time Value Ref Range Status   04/22/2025 03:13 PM 9 0 - 99 pg/mL Final   01/21/2025 10:07 AM 12 0 - 99 pg/mL Final     Hemoglobin A1C   Date/Time Value Ref Range Status   01/21/2025 10:07 AM 6.1 (H) See comment % Final   02/02/2024 02:37 PM 6.3 (H) 4.0 - 5.6 % Final   01/11/2023 01:45 AM 5.9 (A) % Final     Comment:          Diagnosis of Diabetes-Adults   Non-Diabetic: < or = 5.6%   Increased risk for developing diabetes: 5.7-6.4%   Diagnostic of diabetes: > or = 6.5%  .       Monitoring of Diabetes                Age (y)     Therapeutic Goal (%)   Adults:          >18           <7.0   Pediatrics:    13-18           <7.5                   7-12           <8.0                   0- 6            7.5-8.5   American Diabetes Association. Diabetes Care 33(S1), Jan 2010.       LDL Calculated   Date/Time Value Ref Range Status   01/21/2025 11:06  (H) <=99 mg/dL Final     Comment:                                 Near   Borderline      AGE      Desirable  Optimal    High     High     Very High     0-19 Y     0 - 109     ---    110-129   >/= 130     ----    20-24 Y     0 - 119     ---    120-159   >/= 160     ----      >24 Y     0 -  99   100-129  130-159   160-189     >/=190       VLDL   Date/Time Value Ref Range Status   01/21/2025 11:06 AM 13 0 - 40 mg/dL Final      Last I/O:  I/O last 3 completed shifts:  In: - (0 mL/kg)   Out: 700 (6.7 mL/kg) [Urine:700 (0.2 mL/kg/hr)]  Weight: 104 kg     Past Cardiology Tests (Last 3 Years):  EKG:  ECG 12 lead  04/22/2025    Echo:  Transthoracic Echo (TTE) Complete 01/21/2025:  1. The left ventricular systolic function is normal, with a visually estimated ejection fraction of 60-65%.  2. Abnormal septal motion consistent with post-operative status.  3. There is normal right ventricular global systolic function.  4. Mildly enlarged right ventricle.  5. Mild aortic valve regurgitation.  6. Normal aortic root.     Ejection Fractions:  EF   Date/Time Value Ref Range Status   01/21/2025 02:31 PM 63 %      Cath:  No results found for this or any previous visit from the past 1095 days.    Stress Test:  Nuclear Stress Test 01/22/2025:  1. No evidence of stress-induced ischemia or prior infarction.  2. The left ventricle is normal in size.  3. Normal LV wall motion with a borderline decreased LV EF estimated  at 46%.    Cardiac Imaging:  CT angio coronary art with heartflow if score >30% 04/23/2025:  1. Coronary artery calcium score of 462*.  2. Right dominant system.  3. Overall severe native multi-vessel obstructive coronary artery  disease with patent CABG grafts and a potential subtotally occluded,  unbypassed RCA.  4. Severe proximal, subtotally occluded ramus intermedius.  5. Severe native LM (up to 60%) and LAD (sequential lesions with up  to 70% stenosis) disease with patent LIMA-LAD graft.  6. Moderate LCX disease with patent SVG-OM2 graft.  7. Severe proximal RCA (>70% stenosis) with potential subtotal  occlusion in the mid segment. Distal RCA and RPDA have relatively  weak contrast opacification and may partially backfill via LCX  collaterals.  8. If within goals of care, consider invasive coronary angiogram to  assess for possible RCA and/or ramus intermedius intervention versus  maximized medical management.    CT ANGIO HEART CORONARY 01/10/2023:  Note that examination is limited by inability to perform CT FFR  analysis. Within the limitations:  1. Tandem atherosclerotic plaques in the proximal and mid LAD  resulting  in up to 70% stenosis.  2. Atherosclerotic calcifications in the RCA resulting in up to 70%  stenosis of the mid and mid/distal vessel.  3. Atherosclerotic plaque in the left main coronary artery resulting  in up to 50% stenosis.  4. Coronary calcium score of 558.6     Past Medical History:  He has no past medical history on file.    Past Surgical History:  He has a past surgical history that includes Hernia repair (02/24/2015); CT angio abdomen pelvis w and or wo IV IV contrast (11/25/2013); MR angio head wo IV contrast (2/28/2016); MR angio neck wo IV contrast (2/28/2016); and CT angio coronary art with heartflow if score >30% (1/10/2023).      Social History:  He reports that he has never smoked. He has never used smokeless tobacco. He reports that he does not drink alcohol and does not use drugs.    Family History:  Family History[1]     Allergies:  Patient has no known allergies.    Inpatient Medications:  Scheduled Medications[2]  PRN Medications[3]  Continuous Medications[4]  Outpatient Medications:  Current Outpatient Medications   Medication Instructions    amLODIPine (NORVASC) 5 mg, oral, Daily    atorvastatin (LIPITOR) 80 mg, oral, Daily    atorvastatin (LIPITOR) 80 mg, oral, Daily    blood pressure monitor (Blood Pressure Kit) kit 1 kit, miscellaneous, 3 times weekly    carvedilol (COREG) 12.5 mg, oral, 2 times daily    losartan (COZAAR) 25 mg, oral, Daily    nitroglycerin (NITROSTAT) 0.4 mg, sublingual, Every 5 min PRN     Review of Systems   Constitutional: Negative for chills, diaphoresis, fever and malaise/fatigue.   HENT:  Negative for ear pain, hoarse voice and sore throat.    Eyes:  Negative for double vision and visual disturbance.   Cardiovascular:  Positive for dyspnea on exertion. Negative for chest pain, near-syncope, orthopnea, palpitations and syncope.   Respiratory:  Negative for cough, hemoptysis and shortness of breath.    Hematologic/Lymphatic: Does not bruise/bleed easily.   Skin:   Negative for color change, poor wound healing and suspicious lesions.   Musculoskeletal:  Negative for falls and muscle weakness.   Gastrointestinal:  Positive for bloating and heartburn. Negative for constipation, diarrhea, hematemesis, hematochezia, melena, nausea and vomiting.   Genitourinary:  Negative for dysuria, flank pain and hematuria.   Neurological:  Negative for headaches, light-headedness and weakness.   Psychiatric/Behavioral:  Negative for altered mental status.    Allergic/Immunologic: Negative for environmental allergies.      Physical Exam:  General: NAD, lying in bed  Skin: warm and dry throughout   Head/ neck: no JVD seen at 90 degrees  Cardiac: RRR, S1, S2  Pulm: CTAB, room air   GI: slightly firm and distended, non-tender, +BS [id number]  Extremities: no LE edema   Neuro: no focal neuro deficits, a+ox4  Psych: appropriate mood and behavior, very pleasant      Assessment/Plan   Derik Malin is a 60 y.o. male with PMH significant for GERD, HTN, HLD, CAD s/p CABG x2 (LIMA-LAD, SVG-OM) in 2/2024 @[facility] c/b post-op pAfib s/p medical conversion w/amio & BB (no longer on AAD, never on AC), presented to ED w/unstable angina; CTA cors today revealed patent grafts w/subtotally occluded RCA & ramus intermedius. Plan for LHC & ?potential PCI tomorrow, being admitted under HHVI Service for further management.    CAD s/p CABG x2 (2/2024)  Unstable Angina, improving  - @[facility] in (2/2024); LIMA-LAD, SVG-OM  - TTE (1/2025): EF 60-65%, no WMAs  - ED POCUS without decrease RV/LV function, NO pericardial effusion  - presented to ED w/non-radiating, sharp L-sided CP (at rest)--->currently denies s/sx, cont. SL nitro PRN    - no ischemic changes on ECG  - HS trop: 6, 6, BNP 9  - CTA cors today: total Ca score of 462, patent LIMA-LAD and SVG-OM grafts, prox-distal LM 60%, diffuse prox-mid LAD 50-69%, Lcx 50%, OM1 50%, subtotally occluded mid RCA and prox ramus intermedius.  - N[facility] at [facility] for [facility] with ?potential PCI  tomorrow (cath lab aware)  - cont. Home BB & statin, start daily baby ASA    H/o pAfib  RBBB  - post-CABG complication; s/p medical conversion w/amio & BB (no longer on AAD, never on AC)  - NSR w/RBBB on admit ECG, currently NSR on tele HR 60s  - cont. Home coreg 12.5mg BID    HTN, uncontrolled  HLD  - BP on on admit: 174/100  - SBP past 24hrs: 130-167; denies s/sx  - reports recently running out of home losartan  - increase home losartan to 50mg daily  - increase home amlodipine to 10mg daily  - cont. Home coreg 12.5mg BID  - lipid panel (1/2025): total cholesterol 168 HDL 32.7  TG 66  - cont. Home statin 80mg daily, ?consider additional agent     GERD  - c/o abd. Fullness/bloating, reports regular Bms  - prev. On PPI s/p CABG, unclear when he stopped  - start daily PPI, TUMS PRN  - monitor s/sx, ?consider KUB if indicated    DVT ppx: subQ lovenox  Emergency Contact: Genia Malin (Wife) #[phone]  DISPO: likely no needs, independent  - discharge pending [facility]/?potential PCIs tomorrow    All labs, vital signs, tests & imaging results, and medications were reviewed.    To be seen and discussed with AM staff tomorrow    Code Status:  Full Code    I spent 60 minutes in the professional and overall care of this patient.    Caitie Lara, APRN-CNP       [1] No family history on file.  [2]   Scheduled medications   Medication Dose Route Frequency    atorvastatin  80 mg oral Daily    carvedilol  12.5 mg oral BID    enoxaparin  40 mg subcutaneous q24h    lidocaine  1 patch transdermal Daily    losartan  25 mg oral Daily    metoprolol tartrate  100 mg oral Once    sennosides-docusate sodium  2 tablet oral BID   [3]   PRN medications   Medication    acetaminophen    LORazepam    metoprolol    metoprolol    metoprolol    metoprolol tartrate   [4]   Continuous Medications   Medication Dose Last Rate

## 2025-04-24 ENCOUNTER — APPOINTMENT (OUTPATIENT)
Dept: RADIOLOGY | Facility: HOSPITAL | Age: 60
DRG: 303 | End: 2025-04-24
Payer: MEDICARE

## 2025-04-24 LAB
ALBUMIN SERPL BCP-MCNC: 4.3 G/DL (ref 3.4–5)
ANION GAP SERPL CALC-SCNC: 13 MMOL/L (ref 10–20)
BUN SERPL-MCNC: 17 MG/DL (ref 6–23)
CALCIUM SERPL-MCNC: 9.5 MG/DL (ref 8.6–10.6)
CHLORIDE SERPL-SCNC: 108 MMOL/L (ref 98–107)
CO2 SERPL-SCNC: 23 MMOL/L (ref 21–32)
CREAT SERPL-MCNC: 0.99 MG/DL (ref 0.5–1.3)
EGFRCR SERPLBLD CKD-EPI 2021: 86 ML/MIN/1.73M*2
ERYTHROCYTE [DISTWIDTH] IN BLOOD BY AUTOMATED COUNT: 13.2 % (ref 11.5–14.5)
GLUCOSE SERPL-MCNC: 118 MG/DL (ref 74–99)
HCT VFR BLD AUTO: 43.6 % (ref 41–52)
HGB BLD-MCNC: 14.2 G/DL (ref 13.5–17.5)
MAGNESIUM SERPL-MCNC: 2.25 MG/DL (ref 1.6–2.4)
MCH RBC QN AUTO: 28.6 PG (ref 26–34)
MCHC RBC AUTO-ENTMCNC: 32.6 G/DL (ref 32–36)
MCV RBC AUTO: 88 FL (ref 80–100)
NRBC BLD-RTO: 0 /100 WBCS (ref 0–0)
PHOSPHATE SERPL-MCNC: 3.5 MG/DL (ref 2.5–4.9)
PLATELET # BLD AUTO: 278 X10*3/UL (ref 150–450)
POTASSIUM SERPL-SCNC: 3.5 MMOL/L (ref 3.5–5.3)
RBC # BLD AUTO: 4.96 X10*6/UL (ref 4.5–5.9)
SODIUM SERPL-SCNC: 140 MMOL/L (ref 136–145)
WBC # BLD AUTO: 5.3 X10*3/UL (ref 4.4–11.3)

## 2025-04-24 PROCEDURE — 2500000002 HC RX 250 W HCPCS SELF ADMINISTERED DRUGS (ALT 637 FOR MEDICARE OP, ALT 636 FOR OP/ED): Performed by: NURSE PRACTITIONER

## 2025-04-24 PROCEDURE — 83735 ASSAY OF MAGNESIUM: CPT | Performed by: NURSE PRACTITIONER

## 2025-04-24 PROCEDURE — 2500000004 HC RX 250 GENERAL PHARMACY W/ HCPCS (ALT 636 FOR OP/ED): Mod: JZ | Performed by: NURSE PRACTITIONER

## 2025-04-24 PROCEDURE — 99233 SBSQ HOSP IP/OBS HIGH 50: CPT | Performed by: STUDENT IN AN ORGANIZED HEALTH CARE EDUCATION/TRAINING PROGRAM

## 2025-04-24 PROCEDURE — 85027 COMPLETE CBC AUTOMATED: CPT | Performed by: NURSE PRACTITIONER

## 2025-04-24 PROCEDURE — 1200000002 HC GENERAL ROOM WITH TELEMETRY DAILY

## 2025-04-24 PROCEDURE — 80069 RENAL FUNCTION PANEL: CPT | Performed by: NURSE PRACTITIONER

## 2025-04-24 PROCEDURE — 74018 RADEX ABDOMEN 1 VIEW: CPT | Performed by: RADIOLOGY

## 2025-04-24 PROCEDURE — 2500000001 HC RX 250 WO HCPCS SELF ADMINISTERED DRUGS (ALT 637 FOR MEDICARE OP): Performed by: NURSE PRACTITIONER

## 2025-04-24 PROCEDURE — 2500000001 HC RX 250 WO HCPCS SELF ADMINISTERED DRUGS (ALT 637 FOR MEDICARE OP): Performed by: EMERGENCY MEDICINE

## 2025-04-24 PROCEDURE — 2500000001 HC RX 250 WO HCPCS SELF ADMINISTERED DRUGS (ALT 637 FOR MEDICARE OP)

## 2025-04-24 PROCEDURE — 74018 RADEX ABDOMEN 1 VIEW: CPT

## 2025-04-24 PROCEDURE — 36415 COLL VENOUS BLD VENIPUNCTURE: CPT | Performed by: NURSE PRACTITIONER

## 2025-04-24 RX ORDER — METOCLOPRAMIDE 10 MG/1
10 TABLET ORAL EVERY 6 HOURS PRN
Status: DISCONTINUED | OUTPATIENT
Start: 2025-04-24 | End: 2025-04-26 | Stop reason: HOSPADM

## 2025-04-24 RX ORDER — EZETIMIBE 10 MG/1
10 TABLET ORAL NIGHTLY
Status: DISCONTINUED | OUTPATIENT
Start: 2025-04-24 | End: 2025-04-26 | Stop reason: HOSPADM

## 2025-04-24 RX ORDER — POTASSIUM CHLORIDE 20 MEQ/1
20 TABLET, EXTENDED RELEASE ORAL ONCE
Status: COMPLETED | OUTPATIENT
Start: 2025-04-24 | End: 2025-04-24

## 2025-04-24 RX ORDER — POLYETHYLENE GLYCOL 3350 17 G/17G
17 POWDER, FOR SOLUTION ORAL 2 TIMES DAILY
Status: DISCONTINUED | OUTPATIENT
Start: 2025-04-24 | End: 2025-04-26 | Stop reason: HOSPADM

## 2025-04-24 RX ORDER — BISACODYL 10 MG/1
10 SUPPOSITORY RECTAL DAILY PRN
Status: DISCONTINUED | OUTPATIENT
Start: 2025-04-24 | End: 2025-04-26 | Stop reason: HOSPADM

## 2025-04-24 RX ADMIN — POTASSIUM CHLORIDE 20 MEQ: 1500 TABLET, EXTENDED RELEASE ORAL at 08:36

## 2025-04-24 RX ADMIN — LOSARTAN POTASSIUM 25 MG: 25 TABLET, FILM COATED ORAL at 08:39

## 2025-04-24 RX ADMIN — AMLODIPINE BESYLATE 10 MG: 10 TABLET ORAL at 08:42

## 2025-04-24 RX ADMIN — ENOXAPARIN SODIUM 40 MG: 100 INJECTION SUBCUTANEOUS at 20:18

## 2025-04-24 RX ADMIN — CARVEDILOL 12.5 MG: 12.5 TABLET, FILM COATED ORAL at 08:38

## 2025-04-24 RX ADMIN — EZETIMIBE 10 MG: 10 TABLET ORAL at 20:18

## 2025-04-24 RX ADMIN — CARVEDILOL 12.5 MG: 12.5 TABLET, FILM COATED ORAL at 20:18

## 2025-04-24 RX ADMIN — ASPIRIN 81 MG: 81 TABLET, CHEWABLE ORAL at 08:40

## 2025-04-24 RX ADMIN — ATORVASTATIN CALCIUM 80 MG: 80 TABLET, FILM COATED ORAL at 08:36

## 2025-04-24 RX ADMIN — PANTOPRAZOLE SODIUM 40 MG: 40 TABLET, DELAYED RELEASE ORAL at 06:36

## 2025-04-24 SDOH — ECONOMIC STABILITY: HOUSING INSECURITY: AT ANY TIME IN THE PAST 12 MONTHS, WERE YOU HOMELESS OR LIVING IN A SHELTER (INCLUDING NOW)?: NO

## 2025-04-24 SDOH — ECONOMIC STABILITY: FOOD INSECURITY: HOW HARD IS IT FOR YOU TO PAY FOR THE VERY BASICS LIKE FOOD, HOUSING, MEDICAL CARE, AND HEATING?: NOT VERY HARD

## 2025-04-24 SDOH — ECONOMIC STABILITY: INCOME INSECURITY: IN THE PAST 12 MONTHS HAS THE ELECTRIC, GAS, OIL, OR WATER COMPANY THREATENED TO SHUT OFF SERVICES IN YOUR HOME?: NO

## 2025-04-24 SDOH — SOCIAL STABILITY: SOCIAL INSECURITY: WITHIN THE LAST YEAR, HAVE YOU BEEN HUMILIATED OR EMOTIONALLY ABUSED IN OTHER WAYS BY YOUR PARTNER OR EX-PARTNER?: NO

## 2025-04-24 SDOH — SOCIAL STABILITY: SOCIAL INSECURITY: HAVE YOU HAD THOUGHTS OF HARMING ANYONE ELSE?: NO

## 2025-04-24 SDOH — SOCIAL STABILITY: SOCIAL INSECURITY: WITHIN THE LAST YEAR, HAVE YOU BEEN AFRAID OF YOUR PARTNER OR EX-PARTNER?: NO

## 2025-04-24 SDOH — ECONOMIC STABILITY: HOUSING INSECURITY: IN THE LAST 12 MONTHS, WAS THERE A TIME WHEN YOU WERE NOT ABLE TO PAY THE MORTGAGE OR RENT ON TIME?: NO

## 2025-04-24 SDOH — ECONOMIC STABILITY: FOOD INSECURITY: WITHIN THE PAST 12 MONTHS, THE FOOD YOU BOUGHT JUST DIDN'T LAST AND YOU DIDN'T HAVE MONEY TO GET MORE.: NEVER TRUE

## 2025-04-24 SDOH — ECONOMIC STABILITY: FOOD INSECURITY: WITHIN THE PAST 12 MONTHS, YOU WORRIED THAT YOUR FOOD WOULD RUN OUT BEFORE YOU GOT THE MONEY TO BUY MORE.: NEVER TRUE

## 2025-04-24 SDOH — SOCIAL STABILITY: SOCIAL INSECURITY: DOES ANYONE TRY TO KEEP YOU FROM HAVING/CONTACTING OTHER FRIENDS OR DOING THINGS OUTSIDE YOUR HOME?: NO

## 2025-04-24 SDOH — SOCIAL STABILITY: SOCIAL INSECURITY: DO YOU FEEL ANYONE HAS EXPLOITED OR TAKEN ADVANTAGE OF YOU FINANCIALLY OR OF YOUR PERSONAL PROPERTY?: NO

## 2025-04-24 SDOH — ECONOMIC STABILITY: TRANSPORTATION INSECURITY: IN THE PAST 12 MONTHS, HAS LACK OF TRANSPORTATION KEPT YOU FROM MEDICAL APPOINTMENTS OR FROM GETTING MEDICATIONS?: NO

## 2025-04-24 SDOH — SOCIAL STABILITY: SOCIAL INSECURITY: HAVE YOU HAD ANY THOUGHTS OF HARMING ANYONE ELSE?: NO

## 2025-04-24 SDOH — ECONOMIC STABILITY: HOUSING INSECURITY: IN THE PAST 12 MONTHS, HOW MANY TIMES HAVE YOU MOVED WHERE YOU WERE LIVING?: 0

## 2025-04-24 SDOH — SOCIAL STABILITY: SOCIAL INSECURITY: WERE YOU ABLE TO COMPLETE ALL THE BEHAVIORAL HEALTH SCREENINGS?: YES

## 2025-04-24 SDOH — SOCIAL STABILITY: SOCIAL INSECURITY: ABUSE: ADULT

## 2025-04-24 SDOH — SOCIAL STABILITY: SOCIAL INSECURITY: ARE THERE ANY APPARENT SIGNS OF INJURIES/BEHAVIORS THAT COULD BE RELATED TO ABUSE/NEGLECT?: NO

## 2025-04-24 SDOH — SOCIAL STABILITY: SOCIAL INSECURITY: ARE YOU OR HAVE YOU BEEN THREATENED OR ABUSED PHYSICALLY, EMOTIONALLY, OR SEXUALLY BY ANYONE?: NO

## 2025-04-24 SDOH — SOCIAL STABILITY: SOCIAL INSECURITY: DO YOU FEEL UNSAFE GOING BACK TO THE PLACE WHERE YOU ARE LIVING?: NO

## 2025-04-24 SDOH — SOCIAL STABILITY: SOCIAL INSECURITY: HAS ANYONE EVER THREATENED TO HURT YOUR FAMILY OR YOUR PETS?: NO

## 2025-04-24 ASSESSMENT — ACTIVITIES OF DAILY LIVING (ADL)
TOILETING: INDEPENDENT
ADEQUATE_TO_COMPLETE_ADL: YES
GROOMING: INDEPENDENT
PATIENT'S MEMORY ADEQUATE TO SAFELY COMPLETE DAILY ACTIVITIES?: YES
HEARING - LEFT EAR: FUNCTIONAL
WALKS IN HOME: INDEPENDENT
DRESSING YOURSELF: INDEPENDENT
LACK_OF_TRANSPORTATION: NO
HEARING - RIGHT EAR: FUNCTIONAL
FEEDING YOURSELF: INDEPENDENT
JUDGMENT_ADEQUATE_SAFELY_COMPLETE_DAILY_ACTIVITIES: YES
BATHING: INDEPENDENT
LACK_OF_TRANSPORTATION: NO

## 2025-04-24 ASSESSMENT — COGNITIVE AND FUNCTIONAL STATUS - GENERAL
MOBILITY SCORE: 24
DAILY ACTIVITIY SCORE: 24
PATIENT BASELINE BEDBOUND: NO

## 2025-04-24 ASSESSMENT — LIFESTYLE VARIABLES
PRESCIPTION_ABUSE_PAST_12_MONTHS: NO
SKIP TO QUESTIONS 9-10: 1
AUDIT-C TOTAL SCORE: 0
AUDIT-C TOTAL SCORE: 0
HOW MANY STANDARD DRINKS CONTAINING ALCOHOL DO YOU HAVE ON A TYPICAL DAY: PATIENT DOES NOT DRINK
SUBSTANCE_ABUSE_PAST_12_MONTHS: NO
HOW OFTEN DO YOU HAVE 6 OR MORE DRINKS ON ONE OCCASION: NEVER
HOW OFTEN DO YOU HAVE A DRINK CONTAINING ALCOHOL: NEVER

## 2025-04-24 ASSESSMENT — PATIENT HEALTH QUESTIONNAIRE - PHQ9
1. LITTLE INTEREST OR PLEASURE IN DOING THINGS: NOT AT ALL
SUM OF ALL RESPONSES TO PHQ9 QUESTIONS 1 & 2: 0
2. FEELING DOWN, DEPRESSED OR HOPELESS: NOT AT ALL

## 2025-04-24 ASSESSMENT — PAIN - FUNCTIONAL ASSESSMENT: PAIN_FUNCTIONAL_ASSESSMENT: 0-10

## 2025-04-24 ASSESSMENT — PAIN SCALES - GENERAL: PAINLEVEL_OUTOF10: 0 - NO PAIN

## 2025-04-24 NOTE — PROGRESS NOTES
Subjective Data:  Patient felt chest pressure lasting ~2-3 min last night at rest that self-resolved, otherwise denies CP/SOB at rest. Reports his abdominal fullness improved after vomiting last night; states this happens at home as well. Discussed importance of med compliance (especially after interventions), to which he verbalized understanding.    - LHC w/?PCI today  - KUB   - reglan PRN  - start zetia    Overnight Events:    No acute events overnight.      Objective Data:  Last Recorded Vitals:  Vitals:    04/24/25 0821 04/24/25 0838 04/24/25 0839 04/24/25 0842   BP: 132/76 126/84 126/84 126/84   BP Location: Left arm      Patient Position: Lying      Pulse: 72 71 71 71   Resp: 16      Temp:       TempSrc:       SpO2: 96%      Weight:       Height:         Last Labs:  CBC - 4/23/2025:  6:33 PM  4.6 14.1 264    41.7      CMP - 4/23/2025:  6:33 PM  9.9 7.9 42 --- 1.0   4.0 4.3 66 66      PTT - 4/23/2025:  6:33 PM  1.3   14.0 25     TROPHS   Date/Time Value Ref Range Status   04/22/2025 08:13 PM 6 0 - 53 ng/L Final   04/22/2025 03:13 PM 6 0 - 53 ng/L Final   01/21/2025 11:06 AM 5 0 - 53 ng/L Final     BNP   Date/Time Value Ref Range Status   04/22/2025 03:13 PM 9 0 - 99 pg/mL Final   01/21/2025 10:07 AM 12 0 - 99 pg/mL Final     HGBA1C   Date/Time Value Ref Range Status   01/21/2025 10:07 AM 6.1 See comment % Final   02/02/2024 02:37 PM 6.3 4.0 - 5.6 % Final   01/11/2023 01:45 AM 5.9 % Final     Comment:          Diagnosis of Diabetes-Adults   Non-Diabetic: < or = 5.6%   Increased risk for developing diabetes: 5.7-6.4%   Diagnostic of diabetes: > or = 6.5%  .       Monitoring of Diabetes                Age (y)     Therapeutic Goal (%)   Adults:          >18           <7.0   Pediatrics:    13-18           <7.5                   7-12           <8.0                   0- 6            7.5-8.5   American Diabetes Association. Diabetes Care 33(S1), Jan 2010.       LDLCALC   Date/Time Value Ref Range Status   01/21/2025  11:06  <=99 mg/dL Final     Comment:                                 Near   Borderline      AGE      Desirable  Optimal    High     High     Very High     0-19 Y     0 - 109     ---    110-129   >/= 130     ----    20-24 Y     0 - 119     ---    120-159   >/= 160     ----      >24 Y     0 -  99   100-129  130-159   160-189     >/=190       VLDL   Date/Time Value Ref Range Status   01/21/2025 11:06 AM 13 0 - 40 mg/dL Final      Last I/O:  I/O last 3 completed shifts:  In: - (0 mL/kg)   Out: 700 (6.7 mL/kg) [Urine:700 (0.2 mL/kg/hr)]  Weight: 104 kg     Past Cardiology Tests (Last 3 Years):  EKG:  ECG 12 lead 04/22/2025  NSR w/RBBB, no ischemic changes    Echo:  Transthoracic Echo (TTE) Complete 01/21/2025:   1. The left ventricular systolic function is normal, with a visually estimated ejection fraction of 60-65%.   2. Abnormal septal motion consistent with post-operative status.   3. There is normal right ventricular global systolic function.   4. Mildly enlarged right ventricle.   5. Mild aortic valve regurgitation.   6. Normal aortic root.    Ejection Fractions:  EF   Date/Time Value Ref Range Status   01/21/2025 02:31 PM 63 %      Cath:  No results found for this or any previous visit from the past 1095 days.    Stress Test:  Nuclear Stress Test 01/22/2025:  1. No evidence of stress-induced ischemia or prior infarction.  2. The left ventricle is normal in size.  3. Normal LV wall motion with a borderline decreased LV EF estimated  at 46%.    Cardiac Imaging:  CT angio coronary art with heartflow if score >30% 04/23/2025:  1. Coronary artery calcium score of 462*.  2. Right dominant system.  3. Overall severe native multi-vessel obstructive coronary artery  disease with patent CABG grafts and a potential subtotally occluded,  unbypassed RCA.  4. Severe proximal, subtotally occluded ramus intermedius.  5. Severe native LM (up to 60%) and LAD (sequential lesions with up  to 70% stenosis) disease with patent  LIMA-LAD graft.  6. Moderate LCX disease with patent SVG-OM2 graft.  7. Severe proximal RCA (>70% stenosis) with potential subtotal  occlusion in the mid segment. Distal RCA and RPDA have relatively  weak contrast opacification and may partially backfill via LCX  collaterals.  8. If within goals of care, consider invasive coronary angiogram to  assess for possible RCA and/or ramus intermedius intervention versus  maximized medical management.     CT ANGIO HEART CORONARY 01/10/2023:  Note that examination is limited by inability to perform CT FFR  analysis. Within the limitations:  1. Tandem atherosclerotic plaques in the proximal and mid LAD  resulting in up to 70% stenosis.  2. Atherosclerotic calcifications in the RCA resulting in up to 70%  stenosis of the mid and mid/distal vessel.  3. Atherosclerotic plaque in the left main coronary artery resulting  in up to 50% stenosis.  4. Coronary calcium score of 558.6     Inpatient Medications:  Scheduled Medications[1]  PRN Medications[2]  Continuous Medications[3]    Physical Exam:  General: NAD, sitting on edge of bed  Skin: warm and dry throughout   Head/ neck: no JVD seen at 90 degrees  Cardiac: RRR, S1, S2  Pulm: CTAB, room air   GI: slightly firm and distended, non-tender, +BS [id number]  Extremities: no LE edema   Neuro: no focal neuro deficits, a+ox4  Psych: appropriate mood and behavior, very pleasant      Assessment/Plan   Derik Malin is a 60 y.o. male with PMH significant for GERD, HTN, HLD, CAD s/p CABG x2 (LIMA-LAD, SVG-OM) in 2/2024 @[facility] c/b post-op pAfib s/p medical conversion w/amio & BB (no longer on AAD, never on AC), presented to ED w/unstable angina; CTA cors (4/23) revealed patent grafts w/subtotally occluded RCA & ramus intermedius. Plan for LHC & ?potential PCI, under HHVI Service for further management.     CAD s/p CABG x2 (2/2024)  Unstable Angina, improving  - @[facility] in (2/2024); LIMA-LAD, SVG-OM  - TTE (1/2025): EF 60-65%, no WMAs  - ED POCUS  without decrease RV/LV function, NO pericardial effusion  - presented to ED w/non-radiating, sharp L-sided CP (at rest)--->+chest pressure last night at rest, self resolved after ~2-3min, cont. SL nitro PRN    - no ischemic changes on ECG  - HS trop: 6, 6, BNP 9  - CTA cors (4/23): total Ca score of 462, patent LIMA-LAD and SVG-OM grafts, prox-distal LM 60%, diffuse prox-mid LAD 50-69%, Lcx 50%, OM1 50%, subtotally occluded mid RCA and prox ramus intermedius.  - LHC with ?potential PCI today  - OF NOTE: discussed importance of med compliance especially if PCI is employed, for which patient verbalized understanding  - cont. Home BB & statin, started daily baby ASA in-house (reports taking at home on and off)      H/o pAfib  RBBB  - post-CABG complication; s/p medical conversion w/amio & BB (no longer on AAD, never on AC)  - NSR w/RBBB on admit ECG, currently NSR on tele HR 60s  - cont. Home coreg 12.5mg BID     HTN, improving  HLD  - BP on on admit: 174/100  - SBP past 24hrs: 120-130s (improved w/med adjustments)  - reports recently running out of home losartan & coreg, attempted to obtain refills  - cont. Losartan 50mg daily (increased from home 25mg), cont. Amlodipine 10mg daily (increased from home 5mg)  - cont. Home coreg 12.5mg BID  - lipid panel (1/2025): total cholesterol 168 HDL 32.7  TG 66  - cont. Home statin 80mg daily, start nightly zetia 10mg, ?consider adding PCSK9 on an OP absis     GERD  Abdominal fullness  Vomiting  - c/o abd. Fullness/bloating resolves w/vomiting, reports regular Bms (occurs at home)  - prev. On PPI s/p CABG, unclear when he stopped  - KUB pending, arrange for OP f/up w/GI for potential gastric emptying study  - cont. Daily PPI (started in-house)  - cont. TUMS & reglan PRN (Qtc 468ms)    Prediabetes  - new chart diagnosis  - last hgb A1C: 6.1% (1/2025), prev. 6.3% and 5.9%  - diet-controlled  - monitor Bgs on RFPS     DVT ppx: subQ lovenox  Emergency Contact: Genia Malin  (Wife) #[phone]  DISPO: likely no needs, independent  - discharge in the next 1-2 days pending LHC/?potential PCIs today     All labs, vital signs, tests & imaging results, and medications were reviewed.     Seen and discussed with Dr. Iniguez     Code Status:  Full Code    Caitie Lara, APRN-CNP         [1]   Scheduled medications   Medication Dose Route Frequency    amLODIPine  10 mg oral Daily    aspirin  81 mg oral Daily    atorvastatin  80 mg oral Daily    carvedilol  12.5 mg oral BID    enoxaparin  40 mg subcutaneous q24h    lidocaine  1 patch transdermal Daily    losartan  25 mg oral Daily    pantoprazole  40 mg oral Daily before breakfast    sennosides-docusate sodium  2 tablet oral BID   [2]   PRN medications   Medication    acetaminophen    calcium carbonate    metoclopramide    nitroglycerin   [3]   Continuous Medications   Medication Dose Last Rate

## 2025-04-25 LAB
ALBUMIN SERPL BCP-MCNC: 4.2 G/DL (ref 3.4–5)
ANION GAP SERPL CALC-SCNC: 11 MMOL/L (ref 10–20)
BUN SERPL-MCNC: 18 MG/DL (ref 6–23)
CALCIUM SERPL-MCNC: 9.4 MG/DL (ref 8.6–10.6)
CHLORIDE SERPL-SCNC: 108 MMOL/L (ref 98–107)
CO2 SERPL-SCNC: 25 MMOL/L (ref 21–32)
CREAT SERPL-MCNC: 1.04 MG/DL (ref 0.5–1.3)
EGFRCR SERPLBLD CKD-EPI 2021: 81 ML/MIN/1.73M*2
ERYTHROCYTE [DISTWIDTH] IN BLOOD BY AUTOMATED COUNT: 13.1 % (ref 11.5–14.5)
GLUCOSE SERPL-MCNC: 128 MG/DL (ref 74–99)
HCT VFR BLD AUTO: 42.6 % (ref 41–52)
HGB BLD-MCNC: 13.2 G/DL (ref 13.5–17.5)
MAGNESIUM SERPL-MCNC: 2.24 MG/DL (ref 1.6–2.4)
MCH RBC QN AUTO: 28.1 PG (ref 26–34)
MCHC RBC AUTO-ENTMCNC: 31 G/DL (ref 32–36)
MCV RBC AUTO: 91 FL (ref 80–100)
NRBC BLD-RTO: 0 /100 WBCS (ref 0–0)
PHOSPHATE SERPL-MCNC: 2.9 MG/DL (ref 2.5–4.9)
PLATELET # BLD AUTO: 259 X10*3/UL (ref 150–450)
POTASSIUM SERPL-SCNC: 3.5 MMOL/L (ref 3.5–5.3)
RBC # BLD AUTO: 4.7 X10*6/UL (ref 4.5–5.9)
SODIUM SERPL-SCNC: 140 MMOL/L (ref 136–145)
WBC # BLD AUTO: 5 X10*3/UL (ref 4.4–11.3)

## 2025-04-25 PROCEDURE — 2500000001 HC RX 250 WO HCPCS SELF ADMINISTERED DRUGS (ALT 637 FOR MEDICARE OP): Performed by: EMERGENCY MEDICINE

## 2025-04-25 PROCEDURE — 2500000002 HC RX 250 W HCPCS SELF ADMINISTERED DRUGS (ALT 637 FOR MEDICARE OP, ALT 636 FOR OP/ED): Performed by: NURSE PRACTITIONER

## 2025-04-25 PROCEDURE — 2500000001 HC RX 250 WO HCPCS SELF ADMINISTERED DRUGS (ALT 637 FOR MEDICARE OP)

## 2025-04-25 PROCEDURE — 80069 RENAL FUNCTION PANEL: CPT | Performed by: NURSE PRACTITIONER

## 2025-04-25 PROCEDURE — 2500000001 HC RX 250 WO HCPCS SELF ADMINISTERED DRUGS (ALT 637 FOR MEDICARE OP): Performed by: NURSE PRACTITIONER

## 2025-04-25 PROCEDURE — 1200000002 HC GENERAL ROOM WITH TELEMETRY DAILY

## 2025-04-25 PROCEDURE — 36415 COLL VENOUS BLD VENIPUNCTURE: CPT | Performed by: NURSE PRACTITIONER

## 2025-04-25 PROCEDURE — 2500000004 HC RX 250 GENERAL PHARMACY W/ HCPCS (ALT 636 FOR OP/ED): Mod: JZ | Performed by: NURSE PRACTITIONER

## 2025-04-25 PROCEDURE — 83735 ASSAY OF MAGNESIUM: CPT | Performed by: NURSE PRACTITIONER

## 2025-04-25 PROCEDURE — 85027 COMPLETE CBC AUTOMATED: CPT | Performed by: NURSE PRACTITIONER

## 2025-04-25 PROCEDURE — 2500000002 HC RX 250 W HCPCS SELF ADMINISTERED DRUGS (ALT 637 FOR MEDICARE OP, ALT 636 FOR OP/ED)

## 2025-04-25 PROCEDURE — 99233 SBSQ HOSP IP/OBS HIGH 50: CPT | Performed by: STUDENT IN AN ORGANIZED HEALTH CARE EDUCATION/TRAINING PROGRAM

## 2025-04-25 RX ORDER — POTASSIUM CHLORIDE 750 MG/1
40 TABLET, FILM COATED, EXTENDED RELEASE ORAL ONCE
Status: COMPLETED | OUTPATIENT
Start: 2025-04-25 | End: 2025-04-25

## 2025-04-25 RX ORDER — LOSARTAN POTASSIUM 50 MG/1
50 TABLET ORAL DAILY
Status: DISCONTINUED | OUTPATIENT
Start: 2025-04-26 | End: 2025-04-26 | Stop reason: HOSPADM

## 2025-04-25 RX ORDER — POTASSIUM CHLORIDE 750 MG/1
40 TABLET, FILM COATED, EXTENDED RELEASE ORAL ONCE
Status: COMPLETED | OUTPATIENT
Start: 2025-04-26 | End: 2025-04-26

## 2025-04-25 RX ADMIN — SENNOSIDES AND DOCUSATE SODIUM 2 TABLET: 50; 8.6 TABLET ORAL at 21:43

## 2025-04-25 RX ADMIN — ASPIRIN 81 MG: 81 TABLET, CHEWABLE ORAL at 09:10

## 2025-04-25 RX ADMIN — ATORVASTATIN CALCIUM 80 MG: 80 TABLET, FILM COATED ORAL at 09:09

## 2025-04-25 RX ADMIN — EZETIMIBE 10 MG: 10 TABLET ORAL at 21:44

## 2025-04-25 RX ADMIN — POTASSIUM CHLORIDE 40 MEQ: 750 TABLET, FILM COATED, EXTENDED RELEASE ORAL at 09:09

## 2025-04-25 RX ADMIN — CARVEDILOL 12.5 MG: 12.5 TABLET, FILM COATED ORAL at 09:09

## 2025-04-25 RX ADMIN — LOSARTAN POTASSIUM 25 MG: 25 TABLET, FILM COATED ORAL at 09:10

## 2025-04-25 RX ADMIN — AMLODIPINE BESYLATE 10 MG: 10 TABLET ORAL at 09:09

## 2025-04-25 RX ADMIN — CARVEDILOL 12.5 MG: 12.5 TABLET, FILM COATED ORAL at 21:43

## 2025-04-25 RX ADMIN — ENOXAPARIN SODIUM 40 MG: 100 INJECTION SUBCUTANEOUS at 21:43

## 2025-04-25 RX ADMIN — PANTOPRAZOLE SODIUM 40 MG: 40 TABLET, DELAYED RELEASE ORAL at 06:24

## 2025-04-25 RX ADMIN — ACETAMINOPHEN 650 MG: 325 TABLET ORAL at 21:42

## 2025-04-25 ASSESSMENT — COGNITIVE AND FUNCTIONAL STATUS - GENERAL
DAILY ACTIVITIY SCORE: 24
DAILY ACTIVITIY SCORE: 24
MOBILITY SCORE: 24
MOBILITY SCORE: 24

## 2025-04-25 ASSESSMENT — PAIN - FUNCTIONAL ASSESSMENT
PAIN_FUNCTIONAL_ASSESSMENT: 0-10

## 2025-04-25 ASSESSMENT — PAIN DESCRIPTION - LOCATION: LOCATION: HEAD

## 2025-04-25 ASSESSMENT — PAIN SCALES - GENERAL
PAINLEVEL_OUTOF10: 5 - MODERATE PAIN
PAINLEVEL_OUTOF10: 1
PAINLEVEL_OUTOF10: 0 - NO PAIN

## 2025-04-25 NOTE — CARE PLAN
The patient's goals for the shift include      The clinical goals for the shift include Patient will remain HDS throughout shift      Problem: Fall/Injury  Goal: Not fall by end of shift  Outcome: Progressing  Goal: Be free from injury by end of the shift  Outcome: Progressing  Goal: Verbalize understanding of personal risk factors for fall in the hospital  Outcome: Progressing  Goal: Verbalize understanding of risk factor reduction measures to prevent injury from fall in the home  Outcome: Progressing  Goal: Use assistive devices by end of the shift  Outcome: Progressing  Goal: Pace activities to prevent fatigue by end of the shift  Outcome: Progressing

## 2025-04-25 NOTE — PROGRESS NOTES
Subjective Data:  Patient seen this AM, ambulated in hong without chest pain or shortness of breath. Anxiously awaiting [facility] today.     Today in brief:  - pending [facility] today    Overnight Events:    No acute events overnight.      Objective Data:  Last Recorded Vitals:  Vitals:    04/25/25 0014 04/25/25 0451 04/25/25 0817 04/25/25 1109   BP: 118/65 125/74 129/78 117/60   Pulse: 60 64 60 60   Resp: 18 18 16 15   Temp: 36.5 °C (97.7 °F) 36.7 °C (98.1 °F) 36.5 °C (97.7 °F) 36.6 °C (97.9 °F)   TempSrc:       SpO2: 97% 96% 96% 97%   Weight:  105 kg (230 lb 13.2 oz)     Height:         Last Labs:  CBC - 4/24/2025:  6:15 PM  5.3 14.2 278    43.6      CMP - 4/24/2025:  6:15 PM  9.5 7.9 42 --- 1.0   3.5 4.3 66 66      PTT - 4/23/2025:  6:33 PM  1.3   14.0 25     TROPHS   Date/Time Value Ref Range Status   04/22/2025 08:13 PM 6 0 - 53 ng/L Final   04/22/2025 03:13 PM 6 0 - 53 ng/L Final   01/21/2025 11:06 AM 5 0 - 53 ng/L Final     BNP   Date/Time Value Ref Range Status   04/22/2025 03:13 PM 9 0 - 99 pg/mL Final   01/21/2025 10:07 AM 12 0 - 99 pg/mL Final     HGBA1C   Date/Time Value Ref Range Status   01/21/2025 10:07 AM 6.1 See comment % Final   02/02/2024 02:37 PM 6.3 4.0 - 5.6 % Final   01/11/2023 01:45 AM 5.9 % Final     Comment:          Diagnosis of Diabetes-Adults   Non-Diabetic: < or = 5.6%   Increased risk for developing diabetes: 5.7-6.4%   Diagnostic of diabetes: > or = 6.5%  .       Monitoring of Diabetes                Age (y)     Therapeutic Goal (%)   Adults:          >18           <7.0   Pediatrics:    13-18           <7.5                   7-12           <8.0                   0- 6            7.5-8.5   American Diabetes Association. Diabetes Care 33(S1), Jan 2010.       LDLCALC   Date/Time Value Ref Range Status   01/21/2025 11:06  <=99 mg/dL Final     Comment:                                 Near   Borderline      AGE      Desirable  Optimal    High     High     Very High     0-19 Y     0 - 109     ---     110-129   >/= 130     ----    20-24 Y     0 - 119     ---    120-159   >/= 160     ----      >24 Y     0 -  99   100-129  130-159   160-189     >/=190       VLDL   Date/Time Value Ref Range Status   01/21/2025 11:06 AM 13 0 - 40 mg/dL Final      Last I/O:  I/O last 3 completed shifts:  In: - (0 mL/kg)   Out: 375 (3.6 mL/kg) [Urine:375 (0.1 mL/kg/hr)]  Weight: 104.7 kg     Past Cardiology Tests (Last 3 Years):  EKG:  ECG 12 lead 04/22/2025  NSR w/RBBB, no ischemic changes    Echo:  Transthoracic Echo (TTE) Complete 01/21/2025:   1. The left ventricular systolic function is normal, with a visually estimated ejection fraction of 60-65%.   2. Abnormal septal motion consistent with post-operative status.   3. There is normal right ventricular global systolic function.   4. Mildly enlarged right ventricle.   5. Mild aortic valve regurgitation.   6. Normal aortic root.    Ejection Fractions:  EF   Date/Time Value Ref Range Status   01/21/2025 02:31 PM 63 %      Cath:  No results found for this or any previous visit from the past 1095 days.    Stress Test:  Nuclear Stress Test 01/22/2025:  1. No evidence of stress-induced ischemia or prior infarction.  2. The left ventricle is normal in size.  3. Normal LV wall motion with a borderline decreased LV EF estimated  at 46%.    Cardiac Imaging:  CT angio coronary art with heartflow if score >30% 04/23/2025:  1. Coronary artery calcium score of 462*.  2. Right dominant system.  3. Overall severe native multi-vessel obstructive coronary artery  disease with patent CABG grafts and a potential subtotally occluded,  unbypassed RCA.  4. Severe proximal, subtotally occluded ramus intermedius.  5. Severe native LM (up to 60%) and LAD (sequential lesions with up  to 70% stenosis) disease with patent LIMA-LAD graft.  6. Moderate LCX disease with patent SVG-OM2 graft.  7. Severe proximal RCA (>70% stenosis) with potential subtotal  occlusion in the mid segment. Distal RCA and RPDA have  relatively  weak contrast opacification and may partially backfill via LCX  collaterals.  8. If within goals of care, consider invasive coronary angiogram to  assess for possible RCA and/or ramus intermedius intervention versus  maximized medical management.     CT ANGIO HEART CORONARY 01/10/2023:  Note that examination is limited by inability to perform CT FFR  analysis. Within the limitations:  1. Tandem atherosclerotic plaques in the proximal and mid LAD  resulting in up to 70% stenosis.  2. Atherosclerotic calcifications in the RCA resulting in up to 70%  stenosis of the mid and mid/distal vessel.  3. Atherosclerotic plaque in the left main coronary artery resulting  in up to 50% stenosis.  4. Coronary calcium score of 558.6     Inpatient Medications:  Scheduled Medications[1]  PRN Medications[2]  Continuous Medications[3]    Physical Exam:  General: NAD, sitting on edge of bed  Skin: warm and dry throughout   Head/ neck: no JVD seen at 90 degrees  Cardiac: RRR, S1, S2  Pulm: CTAB, room air   GI: slightly firm and distended, non-tender, +BS [id number]  Extremities: no LE edema   Neuro: no focal neuro deficits, A+O x4  Psych: appropriate mood and behavior, very pleasant      Assessment/Plan   Derik Malin is a 60 y.o. male with PMH significant for GERD, HTN, HLD, CAD s/p CABG x2 (LIMA-LAD, SVG-OM) in 2/2024 @[facility] c/b post-op pAfib s/p medical conversion w/amio & BB (no longer on AAD, never on AC), presented to ED w/unstable angina; CTA cors (4/23) revealed patent grafts w/subtotally occluded RCA & ramus intermedius. Plan for LHC & ?potential PCI, under [facility]VI Service for further management.     CAD s/p CABG x2 (2/2024)  Unstable Angina, improving  - @[facility] in (2/2024); LIMA-LAD, SVG-OM  - TTE (1/2025): EF 60-65%, no WMAs  - ED POCUS without decrease RV/LV function, NO pericardial effusion  - presented to ED w/non-radiating, sharp L-sided CP (at rest)--->+chest pressure on 4/23 at rest, self resolved after ~2-3min,  cont. SL nitro PRN    - no ischemic changes on ECG  - HS trop: 6, 6, BNP 9  - CTA cors (4/23): total Ca score of 462, patent LIMA-LAD and SVG-OM grafts, prox-distal LM 60%, diffuse prox-mid LAD 50-69%, Lcx 50%, OM1 50%, subtotally occluded mid RCA and prox ramus intermedius.  - C pending today  - OF NOTE: discussed importance of med compliance especially if PCI is employed, for which patient verbalized understanding  - cont. Home BB & statin, started daily baby ASA in-house (reports taking at home on and off)      H/o pAfib  RBBB  - post-CABG complication; s/p medical conversion w/amio & BB (no longer on AAD, never on AC)  - NSR w/RBBB on admit ECG, currently NSR on tele HR 60s  - cont. Home coreg 12.5mg BID     HTN, improving  HLD  - BP on on admit: 174/100  - SBP past 24hrs: 117- 129  - reports recently running out of home losartan & coreg, attempted to obtain refills  - cont. Losartan 25mg, cont. Amlodipine 10mg daily (increased from home 5mg)  - losartan to increase to 50 mg daily tomorrow 4/25  - cont. Home coreg 12.5mg BID  - lipid panel (1/2025): total cholesterol 168 HDL 32.7  TG 66  - cont. Home statin 80mg daily, nightly zetia 10mg, ?consider adding PCSK9 on an OP absis     GERD  Abdominal fullness  Vomiting  - c/o abd. Fullness/bloating resolves w/vomiting, reports regular Bms (occurs at home)  - prev. On PPI s/p CABG, unclear when he stopped  - KUB 4/24: moderate amount of stool in nondistended colon, correlate with component of constipation/fecal impaction; no evidence of bowel obstruction   - arrange for OP f/up w/GI for potential gastric emptying study  - cont. Daily PPI (started in-house)  - cont. TUMS & reglan PRN (Qtc 468ms)    Prediabetes  - new chart diagnosis  - last hgb A1C: 6.1% (1/2025), prev. 6.3% and 5.9%  - diet-controlled  - monitor Bgs on RFPS     DVT ppx: subQ lovenox  Emergency Contact: Genia Malin (Wife) #[phone]  DISPO: likely no needs, independent  - discharge in the  next 1-2 days pending [facility] results      All labs, vital signs, tests & imaging results, and medications were reviewed.     Seen and discussed with Dr. Iniguez     Code Status:  Full Code    Brittany Arriaga, APRN-CNP         [1]   Scheduled medications   Medication Dose Route Frequency    amLODIPine  10 mg oral Daily    aspirin  81 mg oral Daily    atorvastatin  80 mg oral Daily    carvedilol  12.5 mg oral BID    enoxaparin  40 mg subcutaneous q24h    ezetimibe  10 mg oral Nightly    lidocaine  1 patch transdermal Daily    losartan  25 mg oral Daily    pantoprazole  40 mg oral Daily before breakfast    polyethylene glycol  17 g oral BID    sennosides-docusate sodium  2 tablet oral BID   [2]   PRN medications   Medication    acetaminophen    bisacodyl    calcium carbonate    metoclopramide    nitroglycerin   [3]   Continuous Medications   Medication Dose Last Rate

## 2025-04-25 NOTE — PROGRESS NOTES
04/25/25 1506   Discharge Planning   Living Arrangements Spouse/significant other   Support Systems Spouse/significant other;Children;Friends/neighbors   Assistance Needed Independent prior to this admission   Type of Residence Private residence   Number of Stairs to Enter Residence 4   Number of Stairs Within Residence 6   Do you have animals or pets at home? No   Who is requesting discharge planning? Provider   Home or Post Acute Services None   Expected Discharge Disposition Home   Does the patient need discharge transport arranged? No     Social Work Note    Interdisciplinary Treatment Plan : Plan for [facility] today, ADOD  in 1-2days pending [facility] results.   - Additional information : None noted at this time   - Support : Wife, Genia is listed NOK. Pt reported he can have assistance from his son, Jarocho as well. Also good supports from pt's [employer] friends.   - Payor : [facility]  - Planned Disposition : Pending on medical outcome  - Barrier to discharge : None noted at this time.     SW met with pt and his wife at bedside to complete initial assessment for offering support and obtaining information for pt's discharge plan.  Pt is alert and fully oriented.   Pt reported his wife, Genia is a main caregiver, and his son, Iván lives in 5 mins away, also can assist pt. Fully independent, and drives himself. Pt also noted he received a walker from last discharge in Feb, 2024. Pt denied any further issues or questions on social work at the moment of assessment. SW will continue to follow and assist.     Mecca Etienne, JOSEPH, LSW

## 2025-04-25 NOTE — CARE PLAN
The patient's goals for the shift include      The clinical goals for the shift include no chest pain    Over the shift, the patient has remained npo for cardiac cath today. Pt with no c/o pain or sob.  Pt up ambulating about the floor. Wife at bedside

## 2025-04-25 NOTE — CARE PLAN
The patient's goals for the shift include      The clinical goals for the shift include no chest pain    Over the shift, the patient did make progress toward the following goals.

## 2025-04-26 VITALS
DIASTOLIC BLOOD PRESSURE: 72 MMHG | OXYGEN SATURATION: 98 % | RESPIRATION RATE: 19 BRPM | SYSTOLIC BLOOD PRESSURE: 135 MMHG | TEMPERATURE: 97.7 F | HEIGHT: 72 IN | WEIGHT: 230.82 LBS | BODY MASS INDEX: 31.26 KG/M2 | HEART RATE: 64 BPM

## 2025-04-26 PROBLEM — I10 HTN (HYPERTENSION): Status: ACTIVE | Noted: 2025-04-26

## 2025-04-26 PROBLEM — R07.9 CHEST PAIN: Status: RESOLVED | Noted: 2025-04-22 | Resolved: 2025-04-26

## 2025-04-26 PROBLEM — I25.10 CAD (CORONARY ARTERY DISEASE): Status: ACTIVE | Noted: 2025-04-26

## 2025-04-26 PROBLEM — R07.9 CHEST PAIN, UNSPECIFIED TYPE: Status: RESOLVED | Noted: 2025-04-23 | Resolved: 2025-04-26

## 2025-04-26 PROCEDURE — 99239 HOSP IP/OBS DSCHRG MGMT >30: CPT | Performed by: STUDENT IN AN ORGANIZED HEALTH CARE EDUCATION/TRAINING PROGRAM

## 2025-04-26 PROCEDURE — 2500000001 HC RX 250 WO HCPCS SELF ADMINISTERED DRUGS (ALT 637 FOR MEDICARE OP)

## 2025-04-26 PROCEDURE — 2500000001 HC RX 250 WO HCPCS SELF ADMINISTERED DRUGS (ALT 637 FOR MEDICARE OP): Performed by: EMERGENCY MEDICINE

## 2025-04-26 PROCEDURE — 2500000001 HC RX 250 WO HCPCS SELF ADMINISTERED DRUGS (ALT 637 FOR MEDICARE OP): Performed by: NURSE PRACTITIONER

## 2025-04-26 PROCEDURE — 2500000004 HC RX 250 GENERAL PHARMACY W/ HCPCS (ALT 636 FOR OP/ED): Performed by: NURSE PRACTITIONER

## 2025-04-26 PROCEDURE — 2500000002 HC RX 250 W HCPCS SELF ADMINISTERED DRUGS (ALT 637 FOR MEDICARE OP, ALT 636 FOR OP/ED): Performed by: STUDENT IN AN ORGANIZED HEALTH CARE EDUCATION/TRAINING PROGRAM

## 2025-04-26 PROCEDURE — RXMED WILLOW AMBULATORY MEDICATION CHARGE

## 2025-04-26 PROCEDURE — 2500000002 HC RX 250 W HCPCS SELF ADMINISTERED DRUGS (ALT 637 FOR MEDICARE OP, ALT 636 FOR OP/ED)

## 2025-04-26 RX ORDER — AMLODIPINE BESYLATE 10 MG/1
10 TABLET ORAL DAILY
Qty: 30 TABLET | Refills: 3 | Status: SHIPPED | OUTPATIENT
Start: 2025-04-27

## 2025-04-26 RX ORDER — CALCIUM CARBONATE 200(500)MG
1000 TABLET,CHEWABLE ORAL 3 TIMES DAILY PRN
Start: 2025-04-26

## 2025-04-26 RX ORDER — EZETIMIBE 10 MG/1
10 TABLET ORAL NIGHTLY
Qty: 30 TABLET | Refills: 3 | Status: SHIPPED | OUTPATIENT
Start: 2025-04-26 | End: 2025-04-26 | Stop reason: HOSPADM

## 2025-04-26 RX ORDER — CARVEDILOL 12.5 MG/1
12.5 TABLET ORAL 2 TIMES DAILY
Qty: 60 TABLET | Refills: 3 | Status: SHIPPED | OUTPATIENT
Start: 2025-04-26

## 2025-04-26 RX ORDER — ATORVASTATIN CALCIUM 80 MG/1
80 TABLET, FILM COATED ORAL DAILY
Qty: 30 TABLET | Refills: 3 | Status: SHIPPED | OUTPATIENT
Start: 2025-04-26

## 2025-04-26 RX ORDER — NAPROXEN SODIUM 220 MG/1
81 TABLET, FILM COATED ORAL DAILY
Qty: 30 TABLET | Refills: 3 | Status: SHIPPED | OUTPATIENT
Start: 2025-04-27

## 2025-04-26 RX ORDER — LOSARTAN POTASSIUM 50 MG/1
50 TABLET ORAL DAILY
Qty: 30 TABLET | Refills: 3 | Status: SHIPPED | OUTPATIENT
Start: 2025-04-27

## 2025-04-26 RX ORDER — POTASSIUM CHLORIDE 750 MG/1
40 TABLET, FILM COATED, EXTENDED RELEASE ORAL ONCE
Status: COMPLETED | OUTPATIENT
Start: 2025-04-26 | End: 2025-04-26

## 2025-04-26 RX ORDER — PANTOPRAZOLE SODIUM 40 MG/1
40 TABLET, DELAYED RELEASE ORAL
Qty: 30 TABLET | Refills: 3 | Status: SHIPPED | OUTPATIENT
Start: 2025-04-27

## 2025-04-26 RX ORDER — NITROGLYCERIN 0.4 MG/1
0.4 TABLET SUBLINGUAL EVERY 5 MIN PRN
Qty: 90 TABLET | Refills: 12 | Status: SHIPPED | OUTPATIENT
Start: 2025-04-26 | End: 2025-04-26 | Stop reason: HOSPADM

## 2025-04-26 RX ORDER — AMOXICILLIN 250 MG
2 CAPSULE ORAL 2 TIMES DAILY PRN
Qty: 60 TABLET | Refills: 3 | Status: SHIPPED | OUTPATIENT
Start: 2025-04-26

## 2025-04-26 RX ADMIN — ASPIRIN 81 MG: 81 TABLET, CHEWABLE ORAL at 08:33

## 2025-04-26 RX ADMIN — AMLODIPINE BESYLATE 10 MG: 10 TABLET ORAL at 08:32

## 2025-04-26 RX ADMIN — LOSARTAN POTASSIUM 50 MG: 50 TABLET, FILM COATED ORAL at 08:33

## 2025-04-26 RX ADMIN — SENNOSIDES AND DOCUSATE SODIUM 2 TABLET: 50; 8.6 TABLET ORAL at 08:33

## 2025-04-26 RX ADMIN — POTASSIUM CHLORIDE 40 MEQ: 750 TABLET, FILM COATED, EXTENDED RELEASE ORAL at 00:23

## 2025-04-26 RX ADMIN — POTASSIUM CHLORIDE 40 MEQ: 750 TABLET, FILM COATED, EXTENDED RELEASE ORAL at 08:33

## 2025-04-26 RX ADMIN — PANTOPRAZOLE SODIUM 40 MG: 40 TABLET, DELAYED RELEASE ORAL at 08:33

## 2025-04-26 RX ADMIN — ATORVASTATIN CALCIUM 80 MG: 80 TABLET, FILM COATED ORAL at 08:32

## 2025-04-26 RX ADMIN — CARVEDILOL 12.5 MG: 12.5 TABLET, FILM COATED ORAL at 08:33

## 2025-04-26 RX ADMIN — POLYETHYLENE GLYCOL 3350 17 G: 17 POWDER, FOR SOLUTION ORAL at 08:38

## 2025-04-26 ASSESSMENT — COGNITIVE AND FUNCTIONAL STATUS - GENERAL
DAILY ACTIVITIY SCORE: 24
MOBILITY SCORE: 24

## 2025-04-26 ASSESSMENT — PAIN - FUNCTIONAL ASSESSMENT: PAIN_FUNCTIONAL_ASSESSMENT: 0-10

## 2025-04-26 ASSESSMENT — PAIN SCALES - GENERAL: PAINLEVEL_OUTOF10: 0 - NO PAIN

## 2025-04-26 NOTE — CARE PLAN
The patient's goals for the shift include      The clinical goals for the shift include pt will sleep a minimum of 4 hours by the end of this shift      Problem: Fall/Injury  Goal: Not fall by end of shift  4/26/2025 0628 by Kalyn Dacosta RN  Outcome: Progressing  4/26/2025 0628 by Kalyn Dacosta RN  Outcome: Progressing  Goal: Be free from injury by end of the shift  4/26/2025 0628 by Kalyn Dacosta RN  Outcome: Progressing  4/26/2025 0628 by Kalyn Dacosta RN  Outcome: Progressing  Goal: Verbalize understanding of personal risk factors for fall in the hospital  4/26/2025 0628 by Kalyn Dacosta RN  Outcome: Progressing  4/26/2025 0628 by Kalyn Dacosta RN  Outcome: Progressing  Goal: Verbalize understanding of risk factor reduction measures to prevent injury from fall in the home  4/26/2025 0628 by Kalyn Dacosta RN  Outcome: Progressing  4/26/2025 0628 by Kalyn Dacosta RN  Outcome: Progressing  Goal: Use assistive devices by end of the shift  4/26/2025 0628 by Kalyn Dacosta RN  Outcome: Progressing  4/26/2025 0628 by Kalyn Dacosta RN  Outcome: Progressing  Goal: Pace activities to prevent fatigue by end of the shift  4/26/2025 0628 by Kalyn Dacosta RN  Outcome: Progressing  4/26/2025 0628 by Kalyn Dacosta RN  Outcome: Progressing     Problem: Pain - Adult  Goal: Verbalizes/displays adequate comfort level or baseline comfort level  Outcome: Progressing     Problem: Safety - Adult  Goal: Free from fall injury  Outcome: Progressing

## 2025-04-26 NOTE — PROGRESS NOTES
04/26/25 1441   Discharge Planning   Living Arrangements Spouse/significant other   Support Systems Spouse/significant other   Type of Residence Private residence   Who is requesting discharge planning? Provider   Home or Post Acute Services None   Expected Discharge Disposition Home   Does the patient need discharge transport arranged? No     Patient is medically ready for dsicharge.  Patient will be discharging home with no additional home going needs.

## 2025-04-26 NOTE — CARE PLAN
The patient's goals for the shift include      The clinical goals for the shift include     Over the shift, the patient walked the halls without c/o sob or cp.  Tolerating diet.  IV access removed and pt for discharge to home today with family. Waiting on meds to beds

## 2025-04-26 NOTE — NURSING NOTE
Patient discharged from LT-7 to home. Patient was able to receive meds to bed and this nurse didn't receive notification of this until around 1615. Brittany Arrigaa, NP, stated that it was still okay to discharge patient and that it would be okay for patient to pick meds up tomorrow from pharmacy. The , Flaquita Johansen, printed med summary and discharge instructions and gave to patient. Patient chose to leave before this nurse could review discharge instructions.

## 2025-04-26 NOTE — DISCHARGE SUMMARY
Discharge Diagnosis  Chest pain    Issues Requiring Follow-Up  Coronary artery disease  HTN  HLD  Concern for delayed gastric emptying     Hospital Course  Derik Malin is a 60 y.o. male with PMH significant for GERD, HTN, HLD, CAD s/p CABG x2 (LIMA-LAD, SVG-OM) in 2/2024 @[facility] c/b post-op pAfib s/p medical conversion w/amio & BB (no longer on AAD, never on AC), presented to ED w/unstable angina; CTA cors (4/23) revealed: total Ca score of 462, patent LIMA-LAD and SVG-OM grafts, prox-distal LM 60%, diffuse prox-mid LAD 50-69%, Lcx 50%, OM1 50%, subtotally occluded mid RCA and prox ramus intermedius.     ED:  Given ASA in ED, ECG negative for acute ischemic changes, negative HS trops w/mildly elevated LFTs, CXR with clear lungs, POCUS without significant pericardial effusion and/or reduced LV/RV function, morphine & lidocaine patch provided some relief. Underwent coronary CTA (results as above), admitted for unstable angina to pursue [facility].    Floor Course:  Continued to experience brief episodes of unstable angina which self-resolved. Discussed importance of med compliance (reports running out of home coreg/losartan for the past month), to which he verbalized understanding. Started baby ASA, and zetia for . Zetia unaffordable at time of discharge ($70/month), therefore discontinued. Can be resumed as an outpatient pending cost.     Patient remained CP free >36 hours with initiation of anti- HTN regimen. BP regimen: amlodipine 10 mg daily, coreg 12.5 mg daily, and losartan 50 mg daily. PRN SL nitro continued upon discharge (has this med available at home).     On date of discharge, HVI primary team discussed resolution of chest pain, lack of troponin elevation, and importance of HTN management and medication compliance. Determined due to lack of ongoing symptoms, will defer invasive ischemic eval at this time and plan for close outpatient cardiology follow- up. New start PCP appointment scheduled on 4/28,  referral placed for cardiology follow- up with Dr. Iniguez.     Patient would benefit from a clinical pharmacy referral outpatient to aid with medication affordability.     Of note, complained of abdominal fullness/bloating, relieved by vomiting (happens at home as well). Reports regular BMs. Started PPI. KUB showed moderate amount of stool in non- distended colon, with successful BMs after initiation of bowel regimen. GI referral placed for potential gastric emptying study.    Discharge weight: 105 kg    After all labs and VS were reviewed the decision was made that the patient was medically stable for discharge.  The patient was discharged in satisfactory condition.    More than 60 minutes were spent in coordinating patient discharge.     Patient seen and discussed with Dr. Iniguez.    Pertinent Physical Exam At Time of Discharge  Physical Exam  Vitals reviewed.   Constitutional:       General: He is not in acute distress.     Appearance: Normal appearance.   HENT:      Mouth/Throat:      Mouth: Mucous membranes are moist.   Cardiovascular:      Rate and Rhythm: Normal rate and regular rhythm.      Pulses: Normal pulses.      Heart sounds: Normal heart sounds.   Pulmonary:      Effort: Pulmonary effort is normal. No respiratory distress.      Breath sounds: Normal breath sounds. No rales.   Musculoskeletal:         General: No swelling. Normal range of motion.   Skin:     General: Skin is warm and dry.   Neurological:      General: No focal deficit present.      Mental Status: He is alert and oriented to person, place, and time.   Psychiatric:         Mood and Affect: Mood normal.         Behavior: Behavior normal.       Home Medications     Medication List      START taking these medications     aspirin 81 mg chewable tablet; Chew 1 tablet (81 mg) once daily.; Start   taking on: April 27, 2025   calcium carbonate 500 mg (200 mg elemental) chewable tablet; Commonly   known as: Tums; Chew 2 tablets (1,000 mg) 3  times a day as needed for   indigestion or heartburn.   ezetimibe 10 mg tablet; Commonly known as: Zetia; Take 1 tablet (10 mg)   by mouth once daily at bedtime.   pantoprazole 40 mg EC tablet; Commonly known as: ProtoNix; Take 1 tablet   (40 mg) by mouth once daily in the morning. Take before meals. Do not   crush, chew, or split.; Start taking on: April 27, 2025   sennosides-docusate sodium 8.6-50 mg tablet; Commonly known as:   Isabella-Colace; Take 2 tablets by mouth 2 times a day as needed for   constipation.     CHANGE how you take these medications     amLODIPine 10 mg tablet; Commonly known as: Norvasc; Take 1 tablet (10   mg) by mouth once daily.; Start taking on: April 27, 2025; What changed:   medication strength, how much to take   atorvastatin 80 mg tablet; Commonly known as: Lipitor; Take 1 tablet (80   mg) by mouth once daily.; What changed: Another medication with the same   name was removed. Continue taking this medication, and follow the   directions you see here.   losartan 50 mg tablet; Commonly known as: Cozaar; Take 1 tablet (50 mg)   by mouth once daily.; Start taking on: April 27, 2025; What changed:   medication strength, how much to take     CONTINUE taking these medications     blood pressure monitor kit; Commonly known as: Blood Pressure Kit; 1 kit   3 (three) times a week.   carvedilol 12.5 mg tablet; Commonly known as: Coreg; Take 1 tablet (12.5   mg) by mouth 2 times a day.   nitroglycerin 0.4 mg SL tablet; Commonly known as: Nitrostat; Place 1   tablet (0.4 mg) under the tongue every 5 minutes if needed for chest pain.       Outpatient Follow-Up  Future Appointments   Date Time Provider Department Center   4/28/2025  2:00 PM Leelee Marie MD [id number] [facility]   6/11/2025  1:30 PM Sameer Ashby MD [id number] [facility]       Brittany Arriaga, APRN-CNP

## 2025-04-26 NOTE — PROGRESS NOTES
04/26/25 1441   Discharge Planning   Living Arrangements Spouse/significant other   Support Systems Spouse/significant other   Type of Residence Private residence   Who is requesting discharge planning? Provider   Home or Post Acute Services None   Expected Discharge Disposition Home   Does the patient need discharge transport arranged? No     Patient is medically ready for discharge.  Patient will be discharging home with no additional home going needs.

## 2025-04-27 ENCOUNTER — PHARMACY VISIT (OUTPATIENT)
Dept: PHARMACY | Facility: CLINIC | Age: 60
End: 2025-04-27
Payer: MEDICARE

## 2025-04-28 ENCOUNTER — APPOINTMENT (OUTPATIENT)
Dept: PRIMARY CARE | Facility: CLINIC | Age: 60
End: 2025-04-28
Payer: MEDICARE

## 2025-06-10 PROBLEM — H52.4 BILATERAL PRESBYOPIA: Status: ACTIVE | Noted: 2025-06-10

## 2025-06-10 PROBLEM — I47.19 PAROXYSMAL ATRIAL TACHYCARDIA: Status: ACTIVE | Noted: 2024-02-14

## 2025-06-10 PROBLEM — M47.812 DEGENERATIVE ARTHRITIS OF CERVICAL SPINE: Status: ACTIVE | Noted: 2025-06-10

## 2025-06-10 PROBLEM — E66.9 OBESITY (BMI 30-39.9): Status: ACTIVE | Noted: 2025-06-10

## 2025-06-10 PROBLEM — H52.203 ASTIGMATISM, BILATERAL: Status: ACTIVE | Noted: 2025-06-10

## 2025-06-10 PROBLEM — K21.9 GASTROESOPHAGEAL REFLUX DISEASE: Status: ACTIVE | Noted: 2025-06-10

## 2025-06-10 PROBLEM — H54.7 IMPAIRED VISION: Status: ACTIVE | Noted: 2025-06-10

## 2025-06-10 PROBLEM — M25.569 JOINT PAIN, KNEE: Status: ACTIVE | Noted: 2025-06-10

## 2025-06-11 ENCOUNTER — APPOINTMENT (OUTPATIENT)
Dept: CARDIOLOGY | Facility: CLINIC | Age: 60
End: 2025-06-11
Payer: MEDICARE

## 2025-06-11 DIAGNOSIS — I25.10 CORONARY ARTERY DISEASE, UNSPECIFIED VESSEL OR LESION TYPE, UNSPECIFIED WHETHER ANGINA PRESENT, UNSPECIFIED WHETHER NATIVE OR TRANSPLANTED HEART: Primary | ICD-10-CM

## 2025-08-27 ENCOUNTER — TELEMEDICINE (OUTPATIENT)
Dept: CARDIOLOGY | Facility: CLINIC | Age: 60
End: 2025-08-27
Payer: MEDICARE

## 2025-08-27 DIAGNOSIS — I10 HYPERTENSION, UNSPECIFIED TYPE: ICD-10-CM

## 2025-08-27 DIAGNOSIS — I25.10 CORONARY ARTERY DISEASE INVOLVING NATIVE HEART, UNSPECIFIED VESSEL OR LESION TYPE, UNSPECIFIED WHETHER ANGINA PRESENT: ICD-10-CM

## 2025-08-27 PROCEDURE — 99215 OFFICE O/P EST HI 40 MIN: CPT

## 2025-08-27 RX ORDER — LOSARTAN POTASSIUM 50 MG/1
50 TABLET ORAL DAILY
Qty: 30 TABLET | Refills: 3 | Status: SHIPPED | OUTPATIENT
Start: 2025-08-27

## 2025-08-27 RX ORDER — AMLODIPINE BESYLATE 10 MG/1
10 TABLET ORAL DAILY
Qty: 30 TABLET | Refills: 3 | Status: SHIPPED | OUTPATIENT
Start: 2025-08-27